# Patient Record
Sex: FEMALE | Race: WHITE | NOT HISPANIC OR LATINO | Employment: FULL TIME | URBAN - METROPOLITAN AREA
[De-identification: names, ages, dates, MRNs, and addresses within clinical notes are randomized per-mention and may not be internally consistent; named-entity substitution may affect disease eponyms.]

---

## 2019-08-23 ENCOUNTER — HOSPITAL ENCOUNTER (OUTPATIENT)
Dept: MAMMOGRAPHY | Facility: HOSPITAL | Age: 59
Discharge: HOME | End: 2019-08-23
Attending: GENERAL PRACTICE

## 2019-08-30 ENCOUNTER — HOSPITAL ENCOUNTER (OUTPATIENT)
Dept: MAMMOGRAPHY | Facility: HOSPITAL | Age: 59
Discharge: HOME | End: 2019-08-30
Attending: GENERAL PRACTICE

## 2019-09-09 ENCOUNTER — TELEPHONE (OUTPATIENT)
Dept: SURGERY | Facility: CLINIC | Age: 59
End: 2019-09-09

## 2019-09-09 NOTE — TELEPHONE ENCOUNTER
New patient appt to see Dr. Quach 10/18/19 1030 am. I had to leave patient a message to call us back and confirm.     np paperwork mailed. lml

## 2019-09-10 ENCOUNTER — TELEPHONE (OUTPATIENT)
Dept: SURGERY | Facility: CLINIC | Age: 59
End: 2019-09-10

## 2019-09-10 DIAGNOSIS — N60.99 ATYPICAL DUCTAL HYPERPLASIA OF BREAST: Primary | ICD-10-CM

## 2019-09-10 NOTE — TELEPHONE ENCOUNTER
Scheduled Bilateral Breast Mri w wo contrast  [facility]  9-24-19 arrive 2:15        LM for pt to call:  Need to pre screen for breast mri    [email or web address]

## 2019-09-24 ENCOUNTER — HOSPITAL ENCOUNTER (OUTPATIENT)
Dept: MRI IMAGING | Facility: HOSPITAL | Age: 59
Discharge: HOME | End: 2019-09-24
Admitting: SURGERY

## 2019-09-24 DIAGNOSIS — N60.99 ATYPICAL DUCTAL HYPERPLASIA OF BREAST: ICD-10-CM

## 2019-09-24 PROCEDURE — A9577 INJ MULTIHANCE: HCPCS | Performed by: SURGERY

## 2019-09-24 PROCEDURE — 77049 MRI BREAST C-+ W/CAD BI: CPT

## 2019-09-24 PROCEDURE — 0 GADOBENATE DIMEGLUMINE 529 MG/ML SOLUTION: Performed by: SURGERY

## 2019-09-24 PROCEDURE — 82565 ASSAY OF CREATININE: CPT

## 2019-09-24 RX ADMIN — GADOBENATE DIMEGLUMINE 20 ML: 529 INJECTION, SOLUTION INTRAVENOUS at 15:38

## 2019-09-25 LAB — CREAT BLDA-MCNC: 0.7 MG/DL (ref 0.6–1.3)

## 2019-10-18 ENCOUNTER — OFFICE VISIT (OUTPATIENT)
Dept: SURGERY | Facility: CLINIC | Age: 59
End: 2019-10-18

## 2019-10-18 ENCOUNTER — PREP FOR SURGERY (OUTPATIENT)
Dept: OTHER | Facility: HOSPITAL | Age: 59
End: 2019-10-18

## 2019-10-18 VITALS
DIASTOLIC BLOOD PRESSURE: 72 MMHG | OXYGEN SATURATION: 98 % | BODY MASS INDEX: 35.16 KG/M2 | HEART RATE: 69 BPM | HEIGHT: 68 IN | SYSTOLIC BLOOD PRESSURE: 128 MMHG | WEIGHT: 232 LBS

## 2019-10-18 DIAGNOSIS — E66.9 OBESITY (BMI 35.0-39.9 WITHOUT COMORBIDITY): ICD-10-CM

## 2019-10-18 DIAGNOSIS — R92.1 BREAST CALCIFICATIONS ON MAMMOGRAM: ICD-10-CM

## 2019-10-18 DIAGNOSIS — N60.99 ATYPICAL DUCTAL HYPERPLASIA OF BREAST: Primary | ICD-10-CM

## 2019-10-18 PROCEDURE — 99244 OFF/OP CNSLTJ NEW/EST MOD 40: CPT | Performed by: SURGERY

## 2019-10-18 RX ORDER — CEFAZOLIN SODIUM 2 G/100ML
2 INJECTION, SOLUTION INTRAVENOUS ONCE
Status: CANCELLED | OUTPATIENT
Start: 2019-11-21 | End: 2019-10-18

## 2019-10-18 RX ORDER — DIAZEPAM 5 MG/1
10 TABLET ORAL ONCE
Status: CANCELLED | OUTPATIENT
Start: 2019-11-21 | End: 2019-10-18

## 2019-10-18 RX ORDER — SODIUM CHLORIDE, SODIUM LACTATE, POTASSIUM CHLORIDE, CALCIUM CHLORIDE 600; 310; 30; 20 MG/100ML; MG/100ML; MG/100ML; MG/100ML
100 INJECTION, SOLUTION INTRAVENOUS CONTINUOUS
Status: CANCELLED | OUTPATIENT
Start: 2019-11-21

## 2019-10-18 NOTE — PROGRESS NOTES
Chief Complaint: Mabel Granger is a 59 y.o. female who was seen in consultation at the request of Nayana Cadena MD  for abnormal breast imaging    History of Present Illness:  Patient presents with Atypical duct hyperplasia left breast.. She noted no new masses, skin changes, nipple discharge, nipple changes prior to her most recent imaging.    Her most recent imaging includes the followin2019  [facility]    Mammo  Mabel [place]  MAMMO SCREEN MOBILE BILAT  Compared to prior imaging studies dated 2018.  Scattered areas of fibroglandular densities.  Calcifications with grouped distribution seen in the left breast at 6:00.  BIRADS Category: 0  MOBILE BILAT SCREENING  Scattered areas of fibroglandular densities.  There is no mammographic evidence of malignancy.  BIRADS Category: 1    2019  [facility]  [place]  DIAGNOSTIC LEFT AMALIA AND 3D JOSE LUIS  Subtle group of amorphous calcifications in the lower, slightly inner left breast, middle depth.  DIAGNOSTIC CATEGORY: 4    2019  [facility]   Radiology  [facility]  MRI BREAST BILATERAL  Metallic clip artifact lower-inner quadrant of left breast. No areas of abnormal enhancement area seen in the vicinity of the metallic clip or in the left breast.  There are no findings suspicious for malignancy in either breast.  BIRADS Category: 2, Benign findings    She had a biopsy on the following day that showed:   2019  [facility]  STEREO BREAST BX/ LT CALC  9 gauge.  Twelve specimens. A stoplight shaped clip was then placed. The clip was observed to fall out. 1 subsequently used free hand technique to place a ribbon clip, since the Z coordinate was 1.5 cm. The biopsy clip was in expected position in the biopsy bed.    2019  [facility]  DIGITAL DIAGNOSTIC POST BIOPSY  Ribbon clip was in satisfactory position in the biopsy bed.    2019  [place]  [facility]  Pathology  Mabel Granger  Left breast, lower inner quadrant stereotactic-guided core biopsy:  - Focal atypical ductal hyperplasia (ADH)  - Mild usual ductal hyperplasia  - Duct octasia  - Microcalcifications  - Negative for Malignancy        She has had her uterus and ovaries removed, is postmenopausal, and takes nor hormones.  She took estrogen alone patch for approximately 3 years, approximately 8 years ago.  She had a hysterectomy and oophorectomy in 2006 for cysts.  Her family history includes the following: She has 1 daughter, 1 sister, no maternal aunts, one paternal aunt.  No family history of breast or ovarian cancer.  She is here for evaluation.    Review of Systems:  Review of Systems   Endocrine: Positive for hot flashes.   Musculoskeletal: Positive for arthralgias and back pain.   All other systems reviewed and are negative.       Past Medical and Surgical History:  Breast Biopsy History:  Patient has had the following breast biopsies:8/30/2019 LEFT STEREOTACTIC  Breast Cancer HIstory:  Patient does not have a past medical history of breast cancer.  Breast Operations, and year:  NONE  Obstetric/Gynecologic History:  Age menstrual periods began: 11  Patient is postmenopausal due to removal of her uterus and both ovaries in the following year: 2006   Number of pregnancies:1  Number of live births: 1  Number of abortions or miscarriages: 0  Age of delivery of first child: 32  Patient breast fed, for the following lenth of time: 3 MONTHS   Length of time taking birth control pills: 3-4 YRS   Patient took hormone replacement during the following dates: 3 YRS   PATIENT HAD UTERUS AND OVARIES REMOVED 2006.    Past Surgical History:   Procedure Laterality Date   • BUNIONECTOMY     • HYSTERECTOMY     • MAMMO STEREOTACTIC BREAST BIOPSY 1ST W WO DEVICE     • TUBAL ABDOMINAL LIGATION         History reviewed. No pertinent past medical history.    Prior Hospitalizations, other than for surgery or  "childbirth, and year:  NONE     Social History     Socioeconomic History   • Marital status:      Spouse name: Not on file   • Number of children: Not on file   • Years of education: Not on file   • Highest education level: Not on file   Tobacco Use   • Smoking status: Former Smoker     Types: Cigarettes     Start date:      Last attempt to quit: 2007     Years since quittin.8   • Smokeless tobacco: Never Used   Substance and Sexual Activity   • Alcohol use: Yes     Comment: 3-4 x per year   • Drug use: No     Patient is .  Patient is employed full time with the following occupation: [employer]  Patient drinks 2 servings of caffeine per day.    Family History:  Family History   Problem Relation Age of Onset   • Colon cancer Mother 55   • Pancreatic cancer Maternal Grandmother 80   • Throat cancer Maternal Grandfather 75       Vital Signs:  /72 (BP Location: Left arm, Patient Position: Sitting, Cuff Size: Adult)   Pulse 69   Ht 172.7 cm (68\")   Wt 105 kg (232 lb)   LMP  (LMP Unknown)   SpO2 98%   Breastfeeding? No   BMI 35.28 kg/m²      Medications:  No current outpatient medications on file.     Allergies:  No Known Allergies    Physical Examination:  /72 (BP Location: Left arm, Patient Position: Sitting, Cuff Size: Adult)   Pulse 69   Ht 172.7 cm (68\")   Wt 105 kg (232 lb)   LMP  (LMP Unknown)   SpO2 98%   Breastfeeding? No   BMI 35.28 kg/m²   General Appearance:  Patient is in no distress.  She is well kept and has an obese build.   Psychiatric:  Patient with appropriate mood and affect. Alert and oriented to self, time, and place.    Breast, RIGHT:  large sized, 40 4D, symmetric with the contralateral side.  Breast skin is without erythema, edema, rashes.  There are no visible abnormalities upon inspection during the arm-raising maneuver or with hands on hips in the sitting position. There is no nipple retraction, discharge or nipple/areolar skin changes.There are " no masses palpable in the sitting or supine positions.    Breast, LEFT:  large sized, 80 4D, symmetric with the contralateral side.  Breast skin is without erythema, edema, rashes.  There are no visible abnormalities upon inspection during the arm-raising maneuver or with hands on hips in the sitting position. There is no nipple retraction, discharge or nipple/areolar skin changes.There are no masses palpable in the sitting or supine positions.  Well-healed mammotomy from her stereotactic biopsy with no erythema warmth or drainage at the 6:00 inner ring location.    Lymphatic:  There is no axillary, cervical, infraclavicular, or supraclavicular adenopathy bilaterally.  Eyes:  Pupils are round and reactive to light.  Cardiovascular:  Heart rate and rhythm are regular.  Respiratory:  Lungs are clear bilaterally with no crackles or wheezes in any lung field.  Gastrointestinal:  Abdomen is soft, nondistended, and nontender. *    Musculoskeletal:  Good strength in all 4 extremities.   There is good range of motion in both shoulders.    Skin:  No new skin lesions or rashes on the skin excluding the breast (see breast exam above).        Imagin2019  [facility]    Mammo  [facility]  MAMMO SCREEN MOBILE BILAT  Compared to prior imaging studies dated 2018.  Scattered areas of fibroglandular densities.  Calcifications with grouped distribution seen in the left breast at 6:00.  BIRADS Category: 0  MOBILE BILAT SCREENING  Scattered areas of fibroglandular densities.  There is no mammographic evidence of malignancy.  BIRADS Category: 1    2019  [facility]  Mammo  [facility]  DIAGNOSTIC LEFT AMALIA AND 3D JOSE LUIS  Subtle group of amorphous calcifications in the lower, slightly inner left breast, middle depth.  DIAGNOSTIC CATEGORY: 4    2019  [facility]   Radiology  [facility]  MRI BREAST BILATERAL  Metallic clip artifact lower-inner quadrant of left breast. No areas of abnormal enhancement  area seen in the vicinity of the metallic clip or in the left breast.  There are no findings suspicious for malignancy in either breast.  BIRADS Category: 2, Benign findings    Pathology:  08/30/2019  [facility]  Radiology  [place]  STEREO BREAST BX/ LT CALC  9 gauge.  Twelve specimens. A stoplight shaped clip was then placed. The clip was observed to fall out. 1 subsequently used free hand technique to place a ribbon clip, since the Z coordinate was 1.5 cm. The biopsy clip was in expected position in the biopsy bed.    08/30/2019  [facility]  Radiology  [facility]  DIGITAL DIAGNOSTIC POST BIOPSY  Ribbon clip was in satisfactory position in the biopsy bed.    08/30/2019  [facility]  Left breast, lower inner quadrant stereotactic-guided core biopsy:  - Focal atypical ductal hyperplasia (ADH)  - Mild usual ductal hyperplasia  - Duct octasia  - Microcalcifications  - Negative for Malignancy    Procedures:      Assessment:   Diagnosis Plan   1. Atypical ductal hyperplasia of breast  Ambulatory Referral to Nutrition Services   2. Breast calcifications on mammogram     3. Obesity (BMI 35.0-39.9 without comorbidity)  Ambulatory Referral to Nutrition Services     1-2  Right breast lower inner quadrant calcifications on mammogram.  No residual calcifications and no residual enhancement on MRI.  Ribbon marker in good position.  Focal atypical duct hyperplasia, usual hyperplasia, duct ectasia and calcifications.  No family history    3-    BMI 35      Plan:  Mabel and KATLIN reviewed her history, imaging, imaging reports and examination together today.  We discussed that atypical hyperplasia, LCIS and family history are the 3 strongest risk factors for the development  of breast cancer outside of a known genetic predisposition. We discussed that for atypical hyperplasia that we recommend excision to ensure no pathologic upgrade and also to remove any remaining abnormal  cells. We discussed that for a Whitney risk > 1.7, that we recommend consultation with medical oncology about risks and benefits of hormonal blockade. We discussed that for a risk (using a model that incorporates detailed family history) > 20% that we recommend MRI for surveillance.   Her Whitney 5 year risk is-1.4 to 7-1/2%.  Her lifetime risk of breast cancer using the [facility] pro- Vamsiceasar Carloteddy model is-8 to 36%.  Based on the above, I have recommended the following:  #1 left breast needle localized lumpectomy for atypical duct hyperplasia.    #2 annual MRI along with mammography for surveillance.  Her next mammogram would be due July 25, 2020 and her next MRI would be due September 26, 2020 so we will plan to do those both together.  3.  Risk reduction: We discussed meeting with a medical oncologist to talk about medicines that reduce breast cancer risk.  We also discussed weight loss.  At this point she has declined a consultation with medical oncology.  She is already in the process of trying to lose weight.  She has gained 40 pounds over the past several years and recently has lost 20 pounds over the past 1 year.  I offered her a consultation with our nutritionist which she would like to pursue so we will schedule this for her.  We discussed the risks of bleeding, infection with the procedure.  She will schedule at her convenience with Kayleen Quach MD        We have spent 60 minutes in visit today, 45 in face to face .      Next Appointment:  Return for surgery.      EMR Dragon/transcription disclaimer:    Much of this encounter note is an electronic transcription/translocation of spoken language to printed text.  The electronic translation of spoken language may permit erroneous, or at times, nonsensical words or phrases to be inadvertently transcribed.  Although I have reviewed the note from such areas, some may still exist.

## 2019-10-23 ENCOUNTER — TRANSCRIBE ORDERS (OUTPATIENT)
Dept: SURGERY | Facility: CLINIC | Age: 59
End: 2019-10-23

## 2019-10-23 DIAGNOSIS — N60.99 ATYPICAL DUCTAL HYPERPLASIA OF BREAST: Primary | ICD-10-CM

## 2019-10-25 ENCOUNTER — TELEPHONE (OUTPATIENT)
Dept: SURGERY | Facility: CLINIC | Age: 59
End: 2019-10-25

## 2019-10-25 PROBLEM — N60.99 ATYPICAL DUCTAL HYPERPLASIA OF BREAST: Status: ACTIVE | Noted: 2019-10-25

## 2019-10-25 NOTE — TELEPHONE ENCOUNTER
Scheduled Surgery 11-21-19  Left Breast Needle Localized Lumpectomy  For atypical hyperplasia    Arrive      5;45  NL          7:30  Surgery  Tf      Pat 11-11-19 @ 1:30  Post Op 12-3-19 arrive 1:45    Spoke to pt she v/u with appts  hangl

## 2019-10-31 ENCOUNTER — TELEPHONE (OUTPATIENT)
Dept: SURGERY | Facility: CLINIC | Age: 59
End: 2019-10-31

## 2019-10-31 NOTE — TELEPHONE ENCOUNTER
LM to arrive for surgery on 11-21-19 at 6:30am        Surgery-Left Breast Needle Localized Lumpectomy for Atypical Hyperplasia    Arrive    6:30  NL         8:30  TF    PAT 11-11-19 1:30  Post Op 12-3-19 arrive 1L45    kdl

## 2019-11-06 ENCOUNTER — TELEPHONE (OUTPATIENT)
Dept: SURGERY | Facility: CLINIC | Age: 59
End: 2019-11-06

## 2019-11-11 ENCOUNTER — APPOINTMENT (OUTPATIENT)
Dept: PREADMISSION TESTING | Facility: HOSPITAL | Age: 59
End: 2019-11-11

## 2019-11-11 VITALS
DIASTOLIC BLOOD PRESSURE: 85 MMHG | WEIGHT: 229.5 LBS | OXYGEN SATURATION: 98 % | TEMPERATURE: 98.1 F | RESPIRATION RATE: 20 BRPM | HEART RATE: 78 BPM | BODY MASS INDEX: 34.78 KG/M2 | HEIGHT: 68 IN | SYSTOLIC BLOOD PRESSURE: 130 MMHG

## 2019-11-11 LAB
DEPRECATED RDW RBC AUTO: 44 FL (ref 37–54)
ERYTHROCYTE [DISTWIDTH] IN BLOOD BY AUTOMATED COUNT: 13 % (ref 12.3–15.4)
HCT VFR BLD AUTO: 38.2 % (ref 34–46.6)
HGB BLD-MCNC: 12.8 G/DL (ref 12–15.9)
MCH RBC QN AUTO: 30.9 PG (ref 26.6–33)
MCHC RBC AUTO-ENTMCNC: 33.5 G/DL (ref 31.5–35.7)
MCV RBC AUTO: 92.3 FL (ref 79–97)
PLATELET # BLD AUTO: 343 10*3/MM3 (ref 140–450)
PMV BLD AUTO: 9.9 FL (ref 6–12)
RBC # BLD AUTO: 4.14 10*6/MM3 (ref 3.77–5.28)
WBC NRBC COR # BLD: 8.1 10*3/MM3 (ref 3.4–10.8)

## 2019-11-11 PROCEDURE — 36415 COLL VENOUS BLD VENIPUNCTURE: CPT

## 2019-11-11 PROCEDURE — 85027 COMPLETE CBC AUTOMATED: CPT | Performed by: SURGERY

## 2019-11-11 NOTE — DISCHARGE INSTRUCTIONS
Take the following medications the morning of surgery with a small sip of water:    NONE    ARRIVE TO OUTPT SURGERY AT 6:30 AM ON 11/21/19    General Instructions:  • Do not eat solid food after midnight the night before surgery.  • You may drink clear liquids day of surgery but must stop at least one hour before your hospital arrival time.  • It is beneficial for you to have a clear drink that contains carbohydrates the day of surgery.  We suggest a 12 to 20 ounce bottle of Gatorade or Powerade for non-diabetic patients or a 12 to 20 ounce bottle of G2 or Powerade Zero for diabetic patients. (Pediatric patients, are not advised to drink a 12 to 20 ounce carbohydrate drink)    Clear liquids are liquids you can see through.  Nothing red in color.     Plain water                               Sports drinks  Sodas                                   Gelatin (Jell-O)  Fruit juices without pulp such as white grape juice and apple juice  Popsicles that contain no fruit or yogurt  Tea or coffee (no cream or milk added)  Gatorade / Powerade  G2 / Powerade Zero    • Infants may have breast milk up to four hours before surgery.  • Infants drinking formula may drink formula up to six hours before surgery.   • Patients who avoid smoking, chewing tobacco and alcohol for 4 weeks prior to surgery have a reduced risk of post-operative complications.  Quit smoking as many days before surgery as you can.  • Do not smoke, use chewing tobacco or drink alcohol the day of surgery.   • If applicable bring your C-PAP/ BI-PAP machine.  • Bring any papers given to you in the doctor’s office.  • Wear clean comfortable clothes.  • Do not wear contact lenses, false eyelashes or make-up.  Bring a case for your glasses.   • Bring crutches or walker if applicable.  • Remove all piercings.  Leave jewelry and any other valuables at home.  • Hair extensions with metal clips must be removed prior to surgery.  • The Pre-Admission Testing nurse will  instruct you to bring medications if unable to obtain an accurate list in Pre-Admission Testing.            Preventing a Surgical Site Infection:  • For 2 to 3 days before surgery, avoid shaving with a razor because the razor can irritate skin and make it easier to develop an infection.    • Any areas of open skin can increase the risk of a post-operative wound infection by allowing bacteria to enter and travel throughout the body.  Notify your surgeon if you have any skin wounds / rashes even if it is not near the expected surgical site.  The area will need assessed to determine if surgery should be delayed until it is healed.  • The night prior to surgery sleep in a clean bed with clean clothing.  Do not allow pets to sleep with you.  • Shower on the morning of surgery using a fresh bar of anti-bacterial soap (such as Dial) and clean washcloth.  Dry with a clean towel and dress in clean clothing.  • Ask your surgeon if you will be receiving antibiotics prior to surgery.  • Make sure you, your family, and all healthcare providers clean their hands with soap and water or an alcohol based hand  before caring for you or your wound.    Day of surgery:  Your arrival time is approximately two hours before your scheduled surgery time.  Upon arrival, a Pre-op nurse and Anesthesiologist will review your health history, obtain vital signs, and answer questions you may have.  The only belongings needed at this time will be a list of your home medications and if applicable your C-PAP/BI-PAP machine.  If you are staying overnight your family can leave the rest of your belongings in the car and bring them to your room later.  A Pre-op nurse will start an IV and you may receive medication in preparation for surgery, including something to help you relax.  Your family will be able to see you in the Pre-op area.  Two visitors at a time will be allowed in the Pre-op room.  While you are in surgery your family should notify  the waiting room  if they leave the waiting room area and provide a contact phone number.    Please be aware that surgery does come with discomfort.  We want to make every effort to control your discomfort so please discuss any uncontrolled symptoms with your nurse.   Your doctor will most likely have prescribed pain medications.      If you are going home after surgery you will receive individualized written care instructions before being discharged.  A responsible adult must drive you to and from the hospital on the day of your surgery and stay with you for 24 hours.    If you are staying overnight following surgery, you will be transported to your hospital room following the recovery period.  [facility] has all private rooms.    You have received a list of surgical assistants for your reference.  If you have any questions please call Pre-Admission Testing at [phone].  Deductibles and co-payments are collected on the day of service. Please be prepared to pay the required co-pay, deductible or deposit on the day of service as defined by your plan.

## 2019-11-14 ENCOUNTER — TELEPHONE (OUTPATIENT)
Dept: SURGERY | Facility: CLINIC | Age: 59
End: 2019-11-14

## 2019-11-14 NOTE — TELEPHONE ENCOUNTER
----- Message from Delphine Sow sent at 2019  3:47 PM EST -----  Regardin/21  Contact: [phone]  Pt called in regards to a procedure she is to have on . Stated she received a letter from her insurance denying coverage of the procedure. Pt wanted to know what the next step was going to be.

## 2019-11-21 ENCOUNTER — HOSPITAL ENCOUNTER (OUTPATIENT)
Dept: MAMMOGRAPHY | Facility: HOSPITAL | Age: 59
Discharge: HOME | End: 2019-11-21

## 2019-11-21 ENCOUNTER — APPOINTMENT (OUTPATIENT)
Dept: GENERAL RADIOLOGY | Facility: HOSPITAL | Age: 59
End: 2019-11-21

## 2019-11-21 ENCOUNTER — HOSPITAL ENCOUNTER (OUTPATIENT)
Facility: HOSPITAL | Age: 59
Setting detail: HOSPITAL OUTPATIENT SURGERY
Discharge: HOME | End: 2019-11-21
Attending: SURGERY | Admitting: SURGERY

## 2019-11-21 ENCOUNTER — ANESTHESIA (OUTPATIENT)
Dept: PERIOP | Facility: HOSPITAL | Age: 59
End: 2019-11-21

## 2019-11-21 ENCOUNTER — ANESTHESIA EVENT (OUTPATIENT)
Dept: PERIOP | Facility: HOSPITAL | Age: 59
End: 2019-11-21

## 2019-11-21 VITALS
DIASTOLIC BLOOD PRESSURE: 69 MMHG | TEMPERATURE: 97 F | OXYGEN SATURATION: 99 % | RESPIRATION RATE: 16 BRPM | HEART RATE: 53 BPM | SYSTOLIC BLOOD PRESSURE: 121 MMHG | WEIGHT: 230.82 LBS | BODY MASS INDEX: 35.1 KG/M2

## 2019-11-21 DIAGNOSIS — N60.99 ATYPICAL DUCTAL HYPERPLASIA OF BREAST: ICD-10-CM

## 2019-11-21 PROCEDURE — 25010000003 LIDOCAINE 1 % SOLUTION 20 ML VIAL: Performed by: SURGERY

## 2019-11-21 PROCEDURE — 25010000002 ONDANSETRON PER 1 MG: Performed by: NURSE ANESTHETIST, CERTIFIED REGISTERED

## 2019-11-21 PROCEDURE — 25010000003 LIDOCAINE 1 % SOLUTION: Performed by: SURGERY

## 2019-11-21 PROCEDURE — 25010000003 CEFAZOLIN IN DEXTROSE 2-4 GM/100ML-% SOLUTION: Performed by: SURGERY

## 2019-11-21 PROCEDURE — 19301 PARTIAL MASTECTOMY: CPT | Performed by: SURGERY

## 2019-11-21 PROCEDURE — 76098 X-RAY EXAM SURGICAL SPECIMEN: CPT

## 2019-11-21 PROCEDURE — 88307 TISSUE EXAM BY PATHOLOGIST: CPT | Performed by: SURGERY

## 2019-11-21 PROCEDURE — 25010000002 FENTANYL CITRATE (PF) 100 MCG/2ML SOLUTION: Performed by: ANESTHESIOLOGY

## 2019-11-21 PROCEDURE — 25010000002 DEXAMETHASONE PER 1 MG: Performed by: NURSE ANESTHETIST, CERTIFIED REGISTERED

## 2019-11-21 PROCEDURE — 25010000002 PROPOFOL 10 MG/ML EMULSION: Performed by: NURSE ANESTHETIST, CERTIFIED REGISTERED

## 2019-11-21 PROCEDURE — S0260 H&P FOR SURGERY: HCPCS | Performed by: SURGERY

## 2019-11-21 PROCEDURE — 25010000002 FENTANYL CITRATE (PF) 100 MCG/2ML SOLUTION: Performed by: NURSE ANESTHETIST, CERTIFIED REGISTERED

## 2019-11-21 RX ORDER — PROMETHAZINE HYDROCHLORIDE 25 MG/1
25 SUPPOSITORY RECTAL ONCE AS NEEDED
Status: DISCONTINUED | OUTPATIENT
Start: 2019-11-21 | End: 2019-11-21 | Stop reason: HOSPADM

## 2019-11-21 RX ORDER — SODIUM CHLORIDE, SODIUM LACTATE, POTASSIUM CHLORIDE, CALCIUM CHLORIDE 600; 310; 30; 20 MG/100ML; MG/100ML; MG/100ML; MG/100ML
9 INJECTION, SOLUTION INTRAVENOUS CONTINUOUS
Status: DISCONTINUED | OUTPATIENT
Start: 2019-11-21 | End: 2019-11-21 | Stop reason: HOSPADM

## 2019-11-21 RX ORDER — LABETALOL HYDROCHLORIDE 5 MG/ML
5 INJECTION, SOLUTION INTRAVENOUS
Status: DISCONTINUED | OUTPATIENT
Start: 2019-11-21 | End: 2019-11-21 | Stop reason: HOSPADM

## 2019-11-21 RX ORDER — ONDANSETRON 2 MG/ML
4 INJECTION INTRAMUSCULAR; INTRAVENOUS ONCE AS NEEDED
Status: DISCONTINUED | OUTPATIENT
Start: 2019-11-21 | End: 2019-11-21 | Stop reason: SDUPTHER

## 2019-11-21 RX ORDER — PROMETHAZINE HYDROCHLORIDE 25 MG/ML
6.25 INJECTION, SOLUTION INTRAMUSCULAR; INTRAVENOUS ONCE AS NEEDED
Status: DISCONTINUED | OUTPATIENT
Start: 2019-11-21 | End: 2019-11-21 | Stop reason: HOSPADM

## 2019-11-21 RX ORDER — ACETAMINOPHEN 325 MG/1
650 TABLET ORAL ONCE AS NEEDED
Status: DISCONTINUED | OUTPATIENT
Start: 2019-11-21 | End: 2019-11-21 | Stop reason: HOSPADM

## 2019-11-21 RX ORDER — FENTANYL CITRATE 50 UG/ML
50 INJECTION, SOLUTION INTRAMUSCULAR; INTRAVENOUS
Status: DISCONTINUED | OUTPATIENT
Start: 2019-11-21 | End: 2019-11-21 | Stop reason: HOSPADM

## 2019-11-21 RX ORDER — PROMETHAZINE HYDROCHLORIDE 25 MG/1
25 TABLET ORAL ONCE AS NEEDED
Status: DISCONTINUED | OUTPATIENT
Start: 2019-11-21 | End: 2019-11-21 | Stop reason: HOSPADM

## 2019-11-21 RX ORDER — FENTANYL CITRATE 50 UG/ML
50 INJECTION, SOLUTION INTRAMUSCULAR; INTRAVENOUS
Status: DISCONTINUED | OUTPATIENT
Start: 2019-11-21 | End: 2019-11-21 | Stop reason: SDUPTHER

## 2019-11-21 RX ORDER — FENTANYL CITRATE 50 UG/ML
100 INJECTION, SOLUTION INTRAMUSCULAR; INTRAVENOUS
Status: DISCONTINUED | OUTPATIENT
Start: 2019-11-21 | End: 2019-11-21 | Stop reason: HOSPADM

## 2019-11-21 RX ORDER — DIPHENHYDRAMINE HCL 25 MG
25 CAPSULE ORAL
Status: DISCONTINUED | OUTPATIENT
Start: 2019-11-21 | End: 2019-11-21 | Stop reason: HOSPADM

## 2019-11-21 RX ORDER — HYDRALAZINE HYDROCHLORIDE 20 MG/ML
5 INJECTION INTRAMUSCULAR; INTRAVENOUS
Status: DISCONTINUED | OUTPATIENT
Start: 2019-11-21 | End: 2019-11-21 | Stop reason: SDUPTHER

## 2019-11-21 RX ORDER — HYDROMORPHONE HYDROCHLORIDE 1 MG/ML
0.5 INJECTION, SOLUTION INTRAMUSCULAR; INTRAVENOUS; SUBCUTANEOUS
Status: DISCONTINUED | OUTPATIENT
Start: 2019-11-21 | End: 2019-11-21 | Stop reason: SDUPTHER

## 2019-11-21 RX ORDER — SODIUM CHLORIDE 0.9 % (FLUSH) 0.9 %
3 SYRINGE (ML) INJECTION EVERY 12 HOURS SCHEDULED
Status: DISCONTINUED | OUTPATIENT
Start: 2019-11-21 | End: 2019-11-21 | Stop reason: HOSPADM

## 2019-11-21 RX ORDER — HYDROMORPHONE HYDROCHLORIDE 1 MG/ML
0.5 INJECTION, SOLUTION INTRAMUSCULAR; INTRAVENOUS; SUBCUTANEOUS
Status: DISCONTINUED | OUTPATIENT
Start: 2019-11-21 | End: 2019-11-21 | Stop reason: HOSPADM

## 2019-11-21 RX ORDER — HYDROCODONE BITARTRATE AND ACETAMINOPHEN 7.5; 325 MG/1; MG/1
1 TABLET ORAL ONCE AS NEEDED
Status: DISCONTINUED | OUTPATIENT
Start: 2019-11-21 | End: 2019-11-21 | Stop reason: SDUPTHER

## 2019-11-21 RX ORDER — PROPOFOL 10 MG/ML
VIAL (ML) INTRAVENOUS AS NEEDED
Status: DISCONTINUED | OUTPATIENT
Start: 2019-11-21 | End: 2019-11-21 | Stop reason: SURG

## 2019-11-21 RX ORDER — EPHEDRINE SULFATE 50 MG/ML
5 INJECTION, SOLUTION INTRAVENOUS ONCE AS NEEDED
Status: DISCONTINUED | OUTPATIENT
Start: 2019-11-21 | End: 2019-11-21 | Stop reason: SDUPTHER

## 2019-11-21 RX ORDER — OXYCODONE AND ACETAMINOPHEN 7.5; 325 MG/1; MG/1
1 TABLET ORAL EVERY 4 HOURS PRN
Status: DISCONTINUED | OUTPATIENT
Start: 2019-11-21 | End: 2019-11-21 | Stop reason: HOSPADM

## 2019-11-21 RX ORDER — FENTANYL CITRATE 50 UG/ML
INJECTION, SOLUTION INTRAMUSCULAR; INTRAVENOUS AS NEEDED
Status: DISCONTINUED | OUTPATIENT
Start: 2019-11-21 | End: 2019-11-21 | Stop reason: SURG

## 2019-11-21 RX ORDER — PROMETHAZINE HYDROCHLORIDE 25 MG/ML
6.25 INJECTION, SOLUTION INTRAMUSCULAR; INTRAVENOUS
Status: DISCONTINUED | OUTPATIENT
Start: 2019-11-21 | End: 2019-11-21 | Stop reason: SDUPTHER

## 2019-11-21 RX ORDER — LIDOCAINE HYDROCHLORIDE 10 MG/ML
3 INJECTION, SOLUTION INFILTRATION; PERINEURAL ONCE
Status: COMPLETED | OUTPATIENT
Start: 2019-11-21 | End: 2019-11-21

## 2019-11-21 RX ORDER — SODIUM CHLORIDE, SODIUM LACTATE, POTASSIUM CHLORIDE, CALCIUM CHLORIDE 600; 310; 30; 20 MG/100ML; MG/100ML; MG/100ML; MG/100ML
100 INJECTION, SOLUTION INTRAVENOUS CONTINUOUS
Status: DISCONTINUED | OUTPATIENT
Start: 2019-11-21 | End: 2019-11-21 | Stop reason: HOSPADM

## 2019-11-21 RX ORDER — EPHEDRINE SULFATE 50 MG/ML
5 INJECTION, SOLUTION INTRAVENOUS ONCE AS NEEDED
Status: DISCONTINUED | OUTPATIENT
Start: 2019-11-21 | End: 2019-11-21 | Stop reason: HOSPADM

## 2019-11-21 RX ORDER — DIAZEPAM 5 MG/1
10 TABLET ORAL ONCE
Status: COMPLETED | OUTPATIENT
Start: 2019-11-21 | End: 2019-11-21

## 2019-11-21 RX ORDER — ACETAMINOPHEN 650 MG/1
650 SUPPOSITORY RECTAL ONCE AS NEEDED
Status: DISCONTINUED | OUTPATIENT
Start: 2019-11-21 | End: 2019-11-21 | Stop reason: HOSPADM

## 2019-11-21 RX ORDER — MIDAZOLAM HYDROCHLORIDE 1 MG/ML
1 INJECTION INTRAMUSCULAR; INTRAVENOUS
Status: DISCONTINUED | OUTPATIENT
Start: 2019-11-21 | End: 2019-11-21 | Stop reason: HOSPADM

## 2019-11-21 RX ORDER — FAMOTIDINE 10 MG/ML
20 INJECTION, SOLUTION INTRAVENOUS ONCE
Status: COMPLETED | OUTPATIENT
Start: 2019-11-21 | End: 2019-11-21

## 2019-11-21 RX ORDER — HYDRALAZINE HYDROCHLORIDE 20 MG/ML
5 INJECTION INTRAMUSCULAR; INTRAVENOUS
Status: DISCONTINUED | OUTPATIENT
Start: 2019-11-21 | End: 2019-11-21 | Stop reason: HOSPADM

## 2019-11-21 RX ORDER — ACETAMINOPHEN 500 MG
1000 TABLET ORAL EVERY 6 HOURS PRN
COMMUNITY

## 2019-11-21 RX ORDER — OXYCODONE AND ACETAMINOPHEN 7.5; 325 MG/1; MG/1
1 TABLET ORAL ONCE AS NEEDED
Status: DISCONTINUED | OUTPATIENT
Start: 2019-11-21 | End: 2019-11-21 | Stop reason: SDUPTHER

## 2019-11-21 RX ORDER — NALOXONE HCL 0.4 MG/ML
0.2 VIAL (ML) INJECTION AS NEEDED
Status: DISCONTINUED | OUTPATIENT
Start: 2019-11-21 | End: 2019-11-21 | Stop reason: HOSPADM

## 2019-11-21 RX ORDER — LIDOCAINE HYDROCHLORIDE 10 MG/ML
0.5 INJECTION, SOLUTION EPIDURAL; INFILTRATION; INTRACAUDAL; PERINEURAL ONCE AS NEEDED
Status: DISCONTINUED | OUTPATIENT
Start: 2019-11-21 | End: 2019-11-21 | Stop reason: HOSPADM

## 2019-11-21 RX ORDER — PROMETHAZINE HYDROCHLORIDE 25 MG/ML
12.5 INJECTION, SOLUTION INTRAMUSCULAR; INTRAVENOUS ONCE AS NEEDED
Status: DISCONTINUED | OUTPATIENT
Start: 2019-11-21 | End: 2019-11-21 | Stop reason: SDUPTHER

## 2019-11-21 RX ORDER — MIDAZOLAM HYDROCHLORIDE 1 MG/ML
2 INJECTION INTRAMUSCULAR; INTRAVENOUS
Status: DISCONTINUED | OUTPATIENT
Start: 2019-11-21 | End: 2019-11-21 | Stop reason: HOSPADM

## 2019-11-21 RX ORDER — PROMETHAZINE HYDROCHLORIDE 25 MG/1
25 TABLET ORAL ONCE AS NEEDED
Status: DISCONTINUED | OUTPATIENT
Start: 2019-11-21 | End: 2019-11-21 | Stop reason: SDUPTHER

## 2019-11-21 RX ORDER — IBUPROFEN 200 MG
400 TABLET ORAL EVERY 6 HOURS PRN
COMMUNITY
End: 2019-11-21 | Stop reason: HOSPADM

## 2019-11-21 RX ORDER — FLUMAZENIL 0.1 MG/ML
0.2 INJECTION INTRAVENOUS AS NEEDED
Status: DISCONTINUED | OUTPATIENT
Start: 2019-11-21 | End: 2019-11-21 | Stop reason: HOSPADM

## 2019-11-21 RX ORDER — GLYCOPYRROLATE 0.2 MG/ML
INJECTION INTRAMUSCULAR; INTRAVENOUS AS NEEDED
Status: DISCONTINUED | OUTPATIENT
Start: 2019-11-21 | End: 2019-11-21 | Stop reason: SURG

## 2019-11-21 RX ORDER — PROMETHAZINE HYDROCHLORIDE 25 MG/1
25 SUPPOSITORY RECTAL ONCE AS NEEDED
Status: DISCONTINUED | OUTPATIENT
Start: 2019-11-21 | End: 2019-11-21 | Stop reason: SDUPTHER

## 2019-11-21 RX ORDER — SODIUM CHLORIDE 0.9 % (FLUSH) 0.9 %
3-10 SYRINGE (ML) INJECTION AS NEEDED
Status: DISCONTINUED | OUTPATIENT
Start: 2019-11-21 | End: 2019-11-21 | Stop reason: HOSPADM

## 2019-11-21 RX ORDER — ONDANSETRON 2 MG/ML
4 INJECTION INTRAMUSCULAR; INTRAVENOUS ONCE AS NEEDED
Status: DISCONTINUED | OUTPATIENT
Start: 2019-11-21 | End: 2019-11-21 | Stop reason: HOSPADM

## 2019-11-21 RX ORDER — LIDOCAINE HYDROCHLORIDE 20 MG/ML
INJECTION, SOLUTION INFILTRATION; PERINEURAL AS NEEDED
Status: DISCONTINUED | OUTPATIENT
Start: 2019-11-21 | End: 2019-11-21 | Stop reason: SURG

## 2019-11-21 RX ORDER — SCOLOPAMINE TRANSDERMAL SYSTEM 1 MG/1
1 PATCH, EXTENDED RELEASE TRANSDERMAL ONCE
Status: DISCONTINUED | OUTPATIENT
Start: 2019-11-21 | End: 2019-11-21 | Stop reason: HOSPADM

## 2019-11-21 RX ORDER — CEFAZOLIN SODIUM 2 G/100ML
2 INJECTION, SOLUTION INTRAVENOUS ONCE
Status: COMPLETED | OUTPATIENT
Start: 2019-11-21 | End: 2019-11-21

## 2019-11-21 RX ORDER — FLUMAZENIL 0.1 MG/ML
0.2 INJECTION INTRAVENOUS AS NEEDED
Status: DISCONTINUED | OUTPATIENT
Start: 2019-11-21 | End: 2019-11-21 | Stop reason: SDUPTHER

## 2019-11-21 RX ORDER — DEXAMETHASONE SODIUM PHOSPHATE 10 MG/ML
INJECTION INTRAMUSCULAR; INTRAVENOUS AS NEEDED
Status: DISCONTINUED | OUTPATIENT
Start: 2019-11-21 | End: 2019-11-21 | Stop reason: SURG

## 2019-11-21 RX ORDER — DIPHENHYDRAMINE HYDROCHLORIDE 50 MG/ML
12.5 INJECTION INTRAMUSCULAR; INTRAVENOUS
Status: DISCONTINUED | OUTPATIENT
Start: 2019-11-21 | End: 2019-11-21 | Stop reason: HOSPADM

## 2019-11-21 RX ORDER — ONDANSETRON 2 MG/ML
INJECTION INTRAMUSCULAR; INTRAVENOUS AS NEEDED
Status: DISCONTINUED | OUTPATIENT
Start: 2019-11-21 | End: 2019-11-21 | Stop reason: SURG

## 2019-11-21 RX ORDER — HYDROCODONE BITARTRATE AND ACETAMINOPHEN 7.5; 325 MG/1; MG/1
1 TABLET ORAL ONCE AS NEEDED
Status: COMPLETED | OUTPATIENT
Start: 2019-11-21 | End: 2019-11-21

## 2019-11-21 RX ADMIN — SCOPOLAMINE 1 PATCH: 1 PATCH TRANSDERMAL at 07:44

## 2019-11-21 RX ADMIN — FAMOTIDINE 20 MG: 10 INJECTION INTRAVENOUS at 09:48

## 2019-11-21 RX ADMIN — PROPOFOL 200 MG: 10 INJECTION, EMULSION INTRAVENOUS at 10:34

## 2019-11-21 RX ADMIN — ONDANSETRON HYDROCHLORIDE 4 MG: 2 SOLUTION INTRAMUSCULAR; INTRAVENOUS at 10:59

## 2019-11-21 RX ADMIN — FENTANYL CITRATE 50 MCG: 50 INJECTION INTRAMUSCULAR; INTRAVENOUS at 10:40

## 2019-11-21 RX ADMIN — FENTANYL CITRATE 50 MCG: 50 INJECTION INTRAMUSCULAR; INTRAVENOUS at 12:05

## 2019-11-21 RX ADMIN — LIDOCAINE HYDROCHLORIDE 3 ML: 10 INJECTION, SOLUTION INFILTRATION; PERINEURAL at 09:05

## 2019-11-21 RX ADMIN — FENTANYL CITRATE 50 MCG: 50 INJECTION INTRAMUSCULAR; INTRAVENOUS at 10:37

## 2019-11-21 RX ADMIN — GLYCOPYRROLATE 0.1 MG: 0.2 INJECTION INTRAMUSCULAR; INTRAVENOUS at 10:30

## 2019-11-21 RX ADMIN — CEFAZOLIN SODIUM 2 G: 2 INJECTION, SOLUTION INTRAVENOUS at 10:28

## 2019-11-21 RX ADMIN — SODIUM CHLORIDE, POTASSIUM CHLORIDE, SODIUM LACTATE AND CALCIUM CHLORIDE 9 ML/HR: 600; 310; 30; 20 INJECTION, SOLUTION INTRAVENOUS at 12:17

## 2019-11-21 RX ADMIN — FENTANYL CITRATE 50 MCG: 50 INJECTION INTRAMUSCULAR; INTRAVENOUS at 11:42

## 2019-11-21 RX ADMIN — DIAZEPAM 10 MG: 5 TABLET ORAL at 07:40

## 2019-11-21 RX ADMIN — HYDROCODONE BITARTRATE AND ACETAMINOPHEN 1 TABLET: 7.5; 325 TABLET ORAL at 11:41

## 2019-11-21 RX ADMIN — SODIUM CHLORIDE, POTASSIUM CHLORIDE, SODIUM LACTATE AND CALCIUM CHLORIDE 100 ML/HR: 600; 310; 30; 20 INJECTION, SOLUTION INTRAVENOUS at 09:48

## 2019-11-21 RX ADMIN — DEXAMETHASONE SODIUM PHOSPHATE 8 MG: 10 INJECTION INTRAMUSCULAR; INTRAVENOUS at 10:37

## 2019-11-21 RX ADMIN — LIDOCAINE HYDROCHLORIDE 100 MG: 20 INJECTION, SOLUTION INFILTRATION; PERINEURAL at 10:34

## 2019-11-21 NOTE — NURSING NOTE
Call placed to pharmacy to request status of pt prescriptions. Spoke to Destinee, she reports she is about to bring medications to pt's room.

## 2019-11-21 NOTE — H&P
There are no changes in the history or physical exam, aside from any that are listed as an addendum at the bottom of this document.   Today, patient will go for the surgery listed in the plan below.    Chief Complaint: Mabel Granger is a 59 y.o. female who was seen in consultation at the request of Nayana Cadena MD  for abnormal breast imaging    History of Present Illness:  Patient presents with Atypical duct hyperplasia left breast.. She noted no new masses, skin changes, nipple discharge, nipple changes prior to her most recent imaging.    Her most recent imaging includes the followin2019  [facility]    Mammo  Mabel Granger  MAMMO SCREEN MOBILE BILAT  Compared to prior imaging studies dated 2018.  Scattered areas of fibroglandular densities.  Calcifications with grouped distribution seen in the left breast at 6:00.  BIRADS Category: 0  MOBILE BILAT SCREENING  Scattered areas of fibroglandular densities.  There is no mammographic evidence of malignancy.  BIRADS Category: 1    2019  [facility]  Mammo  Mabel Reyezer  DIAGNOSTIC LEFT AMALIA AND 3D JOSE LUIS  Subtle group of amorphous calcifications in the lower, slightly inner left breast, middle depth.  DIAGNOSTIC CATEGORY: 4    2019  [facility]   Radiology  [facility]  MRI BREAST BILATERAL  Metallic clip artifact lower-inner quadrant of left breast. No areas of abnormal enhancement area seen in the vicinity of the metallic clip or in the left breast.  There are no findings suspicious for malignancy in either breast.  BIRADS Category: 2, Benign findings    She had a biopsy on the following day that showed:   2019  [facility]  Radiology  Mabel Boston  STEREO BREAST BX/ LT CALC  9 gauge.  Twelve specimens. A stoplight shaped clip was then placed. The clip was observed to fall out. 1 subsequently used free hand technique to place a ribbon clip, since the Z coordinate was 1.5 cm. The biopsy clip was in expected  position in the biopsy bed.    08/30/2019  [facility]  Radiology  Mabel Granger  DIGITAL DIAGNOSTIC POST BIOPSY  Ribbon clip was in satisfactory position in the biopsy bed.    08/30/2019  [facility]  Pathology  Mabel Granger  Left breast, lower inner quadrant stereotactic-guided core biopsy:  - Focal atypical ductal hyperplasia (ADH)  - Mild usual ductal hyperplasia  - Duct octasia  - Microcalcifications  - Negative for Malignancy        She has had her uterus and ovaries removed, is postmenopausal, and takes nor hormones.  She took estrogen alone patch for approximately 3 years, approximately 8 years ago.  She had a hysterectomy and oophorectomy in 2006 for cysts.  Her family history includes the following: She has 1 daughter, 1 sister, no maternal aunts, one paternal aunt.  No family history of breast or ovarian cancer.  She is here for evaluation.    Review of Systems:  Review of Systems   Endocrine: Positive for hot flashes.   Musculoskeletal: Positive for arthralgias and back pain.   All other systems reviewed and are negative.       Past Medical and Surgical History:  Breast Biopsy History:  Patient has had the following breast biopsies:8/30/2019 LEFT STEREOTACTIC  Breast Cancer HIstory:  Patient does not have a past medical history of breast cancer.  Breast Operations, and year:  NONE  Obstetric/Gynecologic History:  Age menstrual periods began: 11  Patient is postmenopausal due to removal of her uterus and both ovaries in the following year: 2006   Number of pregnancies:1  Number of live births: 1  Number of abortions or miscarriages: 0  Age of delivery of first child: 32  Patient breast fed, for the following lenth of time: 3 MONTHS   Length of time taking birth control pills: 3-4 YRS   Patient took hormone replacement during the following dates: 3 YRS   PATIENT HAD UTERUS AND OVARIES REMOVED 2006.    Past Surgical History:   Procedure Laterality Date   • BUNIONECTOMY     • HYSTERECTOMY     •  "MAMMO STEREOTACTIC BREAST BIOPSY 1ST W WO DEVICE     • TUBAL ABDOMINAL LIGATION         History reviewed. No pertinent past medical history.    Prior Hospitalizations, other than for surgery or childbirth, and year:  NONE     Social History     Socioeconomic History   • Marital status:      Spouse name: Not on file   • Number of children: Not on file   • Years of education: Not on file   • Highest education level: Not on file   Tobacco Use   • Smoking status: Former Smoker     Types: Cigarettes     Start date:      Last attempt to quit:      Years since quittin.8   • Smokeless tobacco: Never Used   Substance and Sexual Activity   • Alcohol use: Yes     Comment: 3-4 x per year   • Drug use: No     Patient is .  Patient is employed full time with the following occupation: [employer]  Patient drinks 2 servings of caffeine per day.    Family History:  Family History   Problem Relation Age of Onset   • Colon cancer Mother 55   • Pancreatic cancer Maternal Grandmother 80   • Throat cancer Maternal Grandfather 75       Vital Signs:  /72 (BP Location: Left arm, Patient Position: Sitting, Cuff Size: Adult)   Pulse 69   Ht 172.7 cm (68\")   Wt 105 kg (232 lb)   LMP  (LMP Unknown)   SpO2 98%   Breastfeeding? No   BMI 35.28 kg/m²      Medications:  No current outpatient medications on file.     Allergies:  No Known Allergies    Physical Examination:  /72 (BP Location: Left arm, Patient Position: Sitting, Cuff Size: Adult)   Pulse 69   Ht 172.7 cm (68\")   Wt 105 kg (232 lb)   LMP  (LMP Unknown)   SpO2 98%   Breastfeeding? No   BMI 35.28 kg/m²   General Appearance:  Patient is in no distress.  She is well kept and has an obese build.   Psychiatric:  Patient with appropriate mood and affect. Alert and oriented to self, time, and place.    Breast, RIGHT:  large sized, 40 4D, symmetric with the contralateral side.  Breast skin is without erythema, edema, rashes.  There are no " visible abnormalities upon inspection during the arm-raising maneuver or with hands on hips in the sitting position. There is no nipple retraction, discharge or nipple/areolar skin changes.There are no masses palpable in the sitting or supine positions.    Breast, LEFT:  large sized, 80 4D, symmetric with the contralateral side.  Breast skin is without erythema, edema, rashes.  There are no visible abnormalities upon inspection during the arm-raising maneuver or with hands on hips in the sitting position. There is no nipple retraction, discharge or nipple/areolar skin changes.There are no masses palpable in the sitting or supine positions.  Well-healed mammotomy from her stereotactic biopsy with no erythema warmth or drainage at the 6:00 inner ring location.    Lymphatic:  There is no axillary, cervical, infraclavicular, or supraclavicular adenopathy bilaterally.  Eyes:  Pupils are round and reactive to light.  Cardiovascular:  Heart rate and rhythm are regular.  Respiratory:  Lungs are clear bilaterally with no crackles or wheezes in any lung field.  Gastrointestinal:  Abdomen is soft, nondistended, and nontender. *    Musculoskeletal:  Good strength in all 4 extremities.   There is good range of motion in both shoulders.    Skin:  No new skin lesions or rashes on the skin excluding the breast (see breast exam above).        Imagin2019  [facility]    [employer]  Mabel Granger  MAMMO SCREEN MOBILE BILAT  Compared to prior imaging studies dated 2018.  Scattered areas of fibroglandular densities.  Calcifications with grouped distribution seen in the left breast at 6:00.  BIRADS Category: 0  MOBILE BILAT SCREENING  Scattered areas of fibroglandular densities.  There is no mammographic evidence of malignancy.  BIRADS Category: 1    2019  [facility]  Mammo  Mabel Granger  DIAGNOSTIC LEFT AMALIA AND 3D JOSE LUIS  Subtle group of amorphous calcifications in the lower, slightly inner left breast, middle  depth.  DIAGNOSTIC CATEGORY: 4    09/24/2019  [facility]   Radiology  Mabel Crawford  MRI BREAST BILATERAL  Metallic clip artifact lower-inner quadrant of left breast. No areas of abnormal enhancement area seen in the vicinity of the metallic clip or in the left breast.  There are no findings suspicious for malignancy in either breast.  BIRADS Category: 2, Benign findings    Pathology:  08/30/2019  [facility]  Radiology  [facility]  STEREO BREAST BX/ LT CALC  9 gauge.  Twelve specimens. A stoplight shaped clip was then placed. The clip was observed to fall out. 1 subsequently used free hand technique to place a ribbon clip, since the Z coordinate was 1.5 cm. The biopsy clip was in expected position in the biopsy bed.    08/30/2019  [facility]  DIGITAL DIAGNOSTIC POST BIOPSY  Ribbon clip was in satisfactory position in the biopsy bed.    08/30/2019  [facility]  Pathology  [facility]  Left breast, lower inner quadrant stereotactic-guided core biopsy:  - Focal atypical ductal hyperplasia (ADH)  - Mild usual ductal hyperplasia  - Duct octasia  - Microcalcifications  - Negative for Malignancy    Procedures:      Assessment:   Diagnosis Plan   1. Atypical ductal hyperplasia of breast  Ambulatory Referral to Nutrition Services   2. Breast calcifications on mammogram     3. Obesity (BMI 35.0-39.9 without comorbidity)  Ambulatory Referral to Nutrition Services     1-2  LEFT breast lower inner quadrant calcifications on mammogram.  No residual calcifications and no residual enhancement on MRI.  Ribbon marker in good position.  Focal atypical duct hyperplasia, usual hyperplasia, duct ectasia and calcifications.  No family history    3-    BMI 35      Plan:  Mabel and KATLIN reviewed her history, imaging, imaging reports and examination together today.  We discussed that atypical hyperplasia, LCIS and family history are the 3 strongest risk factors for the development  of  breast cancer outside of a known genetic predisposition. We discussed that for atypical hyperplasia that we recommend excision to ensure no pathologic upgrade and also to remove any remaining abnormal cells. We discussed that for a Whitney risk > 1.7, that we recommend consultation with medical oncology about risks and benefits of hormonal blockade. We discussed that for a risk (using a model that incorporates detailed family history) > 20% that we recommend MRI for surveillance.   Her Whitney 5 year risk is-1.4 to 7-1/2%.  Her lifetime risk of breast cancer using the [facility]- [facility] model is-8 to 36%.  Based on the above, I have recommended the following:  #1 left breast needle localized lumpectomy for atypical duct hyperplasia.    #2 annual MRI along with mammography for surveillance.  Her next mammogram would be due July 25, 2020 and her next MRI would be due September 26, 2020 so we will plan to do those both together.  3.  Risk reduction: We discussed meeting with a medical oncologist to talk about medicines that reduce breast cancer risk.  We also discussed weight loss.  At this point she has declined a consultation with medical oncology.  She is already in the process of trying to lose weight.  She has gained 40 pounds over the past several years and recently has lost 20 pounds over the past 1 year.  I offered her a consultation with our nutritionist which she would like to pursue so we will schedule this for her.  We discussed the risks of bleeding, infection with the procedure.  She will schedule at her convenience with Kayleen Quach MD        We have spent 60 minutes in visit today, 45 in face to face .      Next Appointment:  Return for surgery.      EMR Dragon/transcription disclaimer:    Much of this encounter note is an electronic transcription/translocation of spoken language to printed text.  The electronic translation of spoken language may permit erroneous, or at times,  nonsensical words or phrases to be inadvertently transcribed.  Although I have reviewed the note from such areas, some may still exist.

## 2019-11-21 NOTE — ANESTHESIA PROCEDURE NOTES
Airway  Urgency: elective    Date/Time: 11/21/2019 10:35 AM  Airway not difficult    General Information and Staff    Patient location during procedure: OR  Anesthesiologist: Render, Eh Ray, MD  CRNA: Radha Pierce CRNA    Indications and Patient Condition  Indications for airway management: airway protection    Preoxygenated: yes  Mask difficulty assessment: 0 - not attempted    Final Airway Details  Final airway type: supraglottic airway      Successful airway: unique  Size 4    Number of attempts at approach: 1  Assessment: lips, teeth, and gum same as pre-op and atraumatic intubation

## 2019-11-21 NOTE — DISCHARGE INSTRUCTIONS
Dr. Nya Quach  [address]  [phone]    Lumpectomy, [place] Node Biopsy, Excisional Breast Biopsy, Port Placement  Postoperative Discharge Instructions         Keep 6 inch ace wrap in place and dressings dry postoperatively for a total of 72 hours. May then remove dressings and ACE wrap. Can shower and get affected area wet after dressings are removed. Prefer no soaking in the tub for one week. Leave steri-strips in place.   A responsible adult should accompany the patient on discharge and for 24 hours following surgery.   No heavy lifting (>5 lbs) or strenuous upper arm activity, and no raising of arms over the head until followup appointment.   For 24 hours postoperatively, or while taking pain or nausea medications - Do not drive, operate machinery or drink alcohol.   Call the office for any of the following symptoms:    Persistent bleeding   Excessive bruising or swelling   Excessive sleepiness or trouble breathing   Severe pain   Persistent nausea or vomiting   Fever greater than 101.   Patient should keep the postoperative visit that was scheduled for him/her in [facility]. If the patient has forgotten this date, please call the clinic for a reminder of the appointment time. If it is after hours, the patient can call the clinic the next business day for this reminder. [facility] phone number is [phone].

## 2019-11-21 NOTE — ANESTHESIA POSTPROCEDURE EVALUATION
Patient: Mabel Granger    Procedure Summary     Date:  11/21/19 Room / Location:   [facility]  /  [facility]    Anesthesia Start:  1027 Anesthesia Stop:  1118    Procedure:  left breast needle localized lumpectomy for atypical duct hyperplasia. (Left Breast) Diagnosis:       Atypical ductal hyperplasia of breast      (Atypical ductal hyperplasia of breast [N60.99])    Surgeon:  Nya Quach MD Provider:  Render, Eh Glass MD    Anesthesia Type:  general ASA Status:  2          Anesthesia Type: general  Last vitals  BP   121/69 (11/21/19 1320)   Temp   36.1 °C (97 °F) (11/21/19 1120)   Pulse   53 (11/21/19 1320)   Resp   16 (11/21/19 1320)     SpO2   99 % (11/21/19 1320)     Post Anesthesia Care and Evaluation    Patient location during evaluation: bedside  Patient participation: complete - patient participated  Level of consciousness: awake and alert  Pain management: adequate  Airway patency: patent  Anesthetic complications: No anesthetic complications    Cardiovascular status: acceptable  Respiratory status: acceptable  Hydration status: acceptable    Comments: /69 (BP Location: Right arm, Patient Position: Sitting)   Pulse 53   Temp 36.1 °C (97 °F) (Oral)   Resp 16   Wt 105 kg (230 lb 13.2 oz)   LMP  (LMP Unknown)   SpO2 99%   BMI 35.10 kg/m²

## 2019-11-21 NOTE — NURSING NOTE
Call placed to [facility] ([facility] to request status of pt's prescriptions. He reports they have received the faxed prescriptions and the medications are in process.

## 2019-11-21 NOTE — OP NOTE
Operative note    Preoperative Diagnosis: Atypical Hyperplasia, Ductal    Postoperative Diagnosis: Atypical Hyperplasia, Ductal    Procedure Performed: left breast needle localized lumpectomy     Dictating physician and surgeon: Nya Quach MD    EBL:5cc    FINDINGS AND DESCRIPTION OF PROCEDURE:     The patient was brought to the operating room and placed on the table in the supine position. After adequate general LMA anesthesia was obtained, we sterilly prepped and draped the breast and axilla. We did a time out to identify correct patient and correct operative site.  She did receive IV antibiotic within an hour of and prior to incision.       I marked the intended area for resection and planned my incision based on the mammographic localization imaging. The localization need was the following length: 3cm     I included an ellipse of skin overlying the tumor for margins and orientation.    Incision was inferior radial.    I used a 15 blade to incise the skin. I then dissected using bovie electrocautery superiorly, inferiorly, medially and laterally around the lesion.  I examined the specimen using the intraoperative faxitron to document the presence of the lesion within the specimen. No additional margins taken     I then passed the specimens and labelled as follows: For the lumpectomy,  long stitch lateral short stitch superior, double stitch deep.     I then irrigated, assured hemostasis.  I then mobilized the breast posteriorly and closed a deep layer of breast tissue using a running 2-0 vicryl, followed by a superficial layer of breast using a running 2-0 vicryl. I then closed the skin in 2 layers- the first being n interrupted 3-0 vicryl layer, followed by a running 4-0 monocryl.    I then applied lengthwise 1/2 inch steri strips, an island dressing.  Then I wrapped her in 4x4s and a 6 inch ACE wrap.    She tolerated the procedure well, there were no immediate complications, and all counts were correct  at the end of the case.

## 2019-11-25 ENCOUNTER — TELEPHONE (OUTPATIENT)
Dept: SURGERY | Facility: CLINIC | Age: 59
End: 2019-11-25

## 2019-11-25 LAB
CYTO UR: NORMAL
LAB AP CASE REPORT: NORMAL
LAB AP CLINICAL INFORMATION: NORMAL
LAB AP DIAGNOSIS COMMENT: NORMAL
PATH REPORT.FINAL DX SPEC: NORMAL
PATH REPORT.GROSS SPEC: NORMAL

## 2019-11-25 NOTE — TELEPHONE ENCOUNTER
October 18, 2019 left breast needle localized lumpectomy for atypical duct hyperplasia returned as atypical duct hyperplasia no evidence for in situ or invasive carcinoma.  Biopsy site and clip identified.  We will call and let her know.    Final Diagnosis   1.  Left Breast, Oriented Needle-Localization Lumpectomy (17 grams): ATYPICAL DUCTAL HYPERPLASIA (ADH) WITH ASSOCIATED CALCIFICATIONS.               A.  ADH present in 2 of 16 blocks of breast tissue with the largest focus measuring 1.8mm maximally.                B.  Biopsy site identified and clip retrieved.                C.  Unremarkable overlying skin.                 D.  Foci of duct hyperplasia, usual type, and micropapilloma.               E.  No evidence of in situ nor invasive carcinoma identified.                   swm/brb

## 2019-12-04 ENCOUNTER — OFFICE VISIT (OUTPATIENT)
Dept: SURGERY | Facility: CLINIC | Age: 59
End: 2019-12-04

## 2019-12-04 VITALS
SYSTOLIC BLOOD PRESSURE: 126 MMHG | OXYGEN SATURATION: 96 % | HEART RATE: 68 BPM | WEIGHT: 231 LBS | BODY MASS INDEX: 35.01 KG/M2 | DIASTOLIC BLOOD PRESSURE: 84 MMHG | HEIGHT: 68 IN

## 2019-12-04 DIAGNOSIS — E66.9 OBESITY (BMI 35.0-39.9 WITHOUT COMORBIDITY): ICD-10-CM

## 2019-12-04 DIAGNOSIS — R92.1 BREAST CALCIFICATIONS ON MAMMOGRAM: ICD-10-CM

## 2019-12-04 DIAGNOSIS — N60.99 ATYPICAL DUCTAL HYPERPLASIA OF BREAST: Primary | ICD-10-CM

## 2019-12-04 PROCEDURE — 99024 POSTOP FOLLOW-UP VISIT: CPT | Performed by: SURGERY

## 2019-12-04 NOTE — PROGRESS NOTES
Chief Complaint: Mabel Granger is a 59 y.o. female who was seen in consultation at the request of Nayana Cadena MD  for abnormal breast imaging and a postoperative visit    History of Present Illness:  Patient presents with Atypical duct hyperplasia left breast.. She noted no new masses, skin changes, nipple discharge, nipple changes prior to her most recent imaging.    Her most recent imaging includes the followin2019  [facility]    Mammo  Mabel Granger  MAMMO SCREEN MOBILE BILAT  Compared to prior imaging studies dated 2018.  Scattered areas of fibroglandular densities.  Calcifications with grouped distribution seen in the left breast at 6:00.  BIRADS Category: 0  MOBILE BILAT SCREENING  Scattered areas of fibroglandular densities.  There is no mammographic evidence of malignancy.  BIRADS Category: 1    2019  [facility]  Mammo  Mabel Reyezer  DIAGNOSTIC LEFT AMALIA AND 3D JOSE LUIS  Subtle group of amorphous calcifications in the lower, slightly inner left breast, middle depth.  DIAGNOSTIC CATEGORY: 4    2019  [facility]   Radiology  [facility]  MRI BREAST BILATERAL  Metallic clip artifact lower-inner quadrant of left breast. No areas of abnormal enhancement area seen in the vicinity of the metallic clip or in the left breast.  There are no findings suspicious for malignancy in either breast.  BIRADS Category: 2, Benign findings    She had a biopsy on the following day that showed:   2019  [facility]  [place]abel [place]  STEREO BREAST BX/ LT CALC  9 gauge.  Twelve specimens. A stoplight shaped clip was then placed. The clip was observed to fall out. 1 subsequently used free hand technique to place a ribbon clip, since the Z coordinate was 1.5 cm. The biopsy clip was in expected position in the biopsy bed.    2019  [facility]  DIGITAL DIAGNOSTIC POST BIOPSY  Ribbon clip was in satisfactory position in the biopsy  bed.    08/30/2019  [facility]  Pathology  Mabel Granger  Left breast, lower inner quadrant stereotactic-guided core biopsy:  - Focal atypical ductal hyperplasia (ADH)  - Mild usual ductal hyperplasia  - Duct octasia  - Microcalcifications  - Negative for Malignancy        She has had her uterus and ovaries removed, is postmenopausal, and takes nor hormones.  She took estrogen alone patch for approximately 3 years, approximately 8 years ago.  She had a hysterectomy and oophorectomy in 2006 for cysts.  Her family history includes the following: She has 1 daughter, 1 sister, no maternal aunts, one paternal aunt.  No family history of breast or ovarian cancer.    Interval History:  October 18, 2019 left breast needle localized lumpectomy for atypical duct hyperplasia returned as atypical duct hyperplasia no evidence for in situ or invasive carcinoma.  Biopsy site and clip identified.    Denies redness, warmth, drainage from incision.        Review of Systems:  Review of Systems   Constitutional: Negative for unexpected weight change (1 lb wt loss).   Endocrine: Positive for hot flashes.   Musculoskeletal: Positive for arthralgias and back pain.   All other systems reviewed and are negative.       Past Medical and Surgical History:  Breast Biopsy History:  Patient has had the following breast biopsies:8/30/2019 LEFT STEREOTACTIC  Breast Cancer HIstory:  Patient does not have a past medical history of breast cancer.  Breast Operations, and year:  NONE  Obstetric/Gynecologic History:  Age menstrual periods began: 11  Patient is postmenopausal due to removal of her uterus and both ovaries in the following year: 2006   Number of pregnancies:1  Number of live births: 1  Number of abortions or miscarriages: 0  Age of delivery of first child: 32  Patient breast fed, for the following lenth of time: 3 MONTHS   Length of time taking birth control pills: 3-4 YRS   Patient took hormone replacement during the following  "dates: 3 YRS   PATIENT HAD UTERUS AND OVARIES REMOVED .    Past Surgical History:   Procedure Laterality Date   • BREAST LUMPECTOMY Left 2019    Procedure: left breast needle localized lumpectomy for atypical duct hyperplasia.;  Surgeon: Nya Quach MD;  [facility]: [facility];  Service: General   • BUNIONECTOMY     • HYSTERECTOMY     • MAMMO STEREOTACTIC BREAST BIOPSY 1ST W WO DEVICE     • TUBAL ABDOMINAL LIGATION         Past Medical History:   Diagnosis Date   • Breast atypical hyperplasia     LEFT   • PONV (postoperative nausea and vomiting)        Prior Hospitalizations, other than for surgery or childbirth, and year:  NONE     Social History     Socioeconomic History   • Marital status:      Spouse name: Not on file   • Number of children: Not on file   • Years of education: Not on file   • Highest education level: Not on file   Tobacco Use   • Smoking status: Former Smoker     Types: Cigarettes     Start date:      Last attempt to quit:      Years since quittin.9   • Smokeless tobacco: Never Used   Substance and Sexual Activity   • Alcohol use: Yes     Comment: 3-4 x per year   • Drug use: No   • Sexual activity: Defer     Patient is .  Patient is employed full time with the following occupation: [employer]  Patient drinks 2 servings of caffeine per day.    Family History:  Family History   Problem Relation Age of Onset   • Colon cancer Mother 55   • Pancreatic cancer Maternal Grandmother 80   • Throat cancer Maternal Grandfather 75   • Malig Hyperthermia Neg Hx        Vital Signs:  /84 (BP Location: Left arm, Patient Position: Sitting, Cuff Size: Adult)   Pulse 68   Ht 172.7 cm (68\")   Wt 105 kg (231 lb)   LMP  (LMP Unknown)   SpO2 96%   Breastfeeding? No   BMI 35.12 kg/m²      Medications:    Current Outpatient Medications:   •  acetaminophen (TYLENOL) 500 MG tablet, Take 1,000 mg by mouth Every 6 (Six) Hours As Needed for Mild Pain ., Disp: , Rfl: " "  •  HYDROcodone-acetaminophen (NORCO) 5-325 MG per tablet, Take 1-2 tablets by mouth Every 4 to 6 Hours As Needed for pain., Disp: 25 tablet, Rfl: 0  •  ondansetron (ZOFRAN) 4 MG tablet, Take 1-2 tablets by mouth Every 8 (Eight) Hours As Needed for nausea., Disp: 10 tablet, Rfl: 0     Allergies:  No Known Allergies    Physical Examination:  /84 (BP Location: Left arm, Patient Position: Sitting, Cuff Size: Adult)   Pulse 68   Ht 172.7 cm (68\")   Wt 105 kg (231 lb)   LMP  (LMP Unknown)   SpO2 96%   Breastfeeding? No   BMI 35.12 kg/m²   General Appearance:  Patient is in no distress.  She is well kept and has an obese build.   Psychiatric:  Patient with appropriate mood and affect. Alert and oriented to self, time, and place.    Breast, RIGHT:  large sized, 40 4D, symmetric with the contralateral side.  Breast skin is without erythema, edema, rashes.  There are no visible abnormalities upon inspection during the arm-raising maneuver or with hands on hips in the sitting position. There is no nipple retraction, discharge or nipple/areolar skin changes.There are no masses palpable in the sitting or supine positions.    Breast, LEFT:  large sized, 80 4D, symmetric with the contralateral side.  Breast skin is without erythema, edema, rashes.  There are no visible abnormalities upon inspection during the arm-raising maneuver or with hands on hips in the sitting position. There is no nipple retraction, discharge or nipple/areolar skin changes.There are no masses palpable in the sitting or supine positions.  Well-healing radial incision left breast 6:00 from her November 2019 excision of atypical duct hyperplasia.  No erythema warmth or drainage    Lymphatic:  There is no axillary, cervical, infraclavicular, or supraclavicular adenopathy bilaterally.  Eyes:  Pupils are round and reactive to light.  Cardiovascular:  Heart rate and rhythm are regular.  Respiratory:  Lungs are clear bilaterally with no crackles or " wheezes in any lung field.  Gastrointestinal:  Abdomen is soft, nondistended, and nontender.     Musculoskeletal:  Good strength in all 4 extremities.   There is good range of motion in both shoulders.    Skin:  No new skin lesions or rashes on the skin excluding the breast (see breast exam above).        Imagin2019  [facility]    Mammo  [facility]  MAMMO SCREEN MOBILE BILAT  Compared to prior imaging studies dated 2018.  Scattered areas of fibroglandular densities.  Calcifications with grouped distribution seen in the left breast at 6:00.  BIRADS Category: 0  MOBILE BILAT SCREENING  Scattered areas of fibroglandular densities.  There is no mammographic evidence of malignancy.  BIRADS Category: 1    2019  [facility]  Mammo  [facility]  DIAGNOSTIC LEFT AMALIA AND 3D JOSE LUIS  Subtle group of amorphous calcifications in the lower, slightly inner left breast, middle depth.  DIAGNOSTIC CATEGORY: 4    2019  [facility]   Radiology  [facility]  MRI BREAST BILATERAL  Metallic clip artifact lower-inner quadrant of left breast. No areas of abnormal enhancement area seen in the vicinity of the metallic clip or in the left breast.  There are no findings suspicious for malignancy in either breast.  BIRADS Category: 2, Benign findings    Pathology:  2019  [facility]  Radiology  [facility]  STEREO BREAST BX/ LT CALC  9 gauge.  Twelve specimens. A stoplight shaped clip was then placed. The clip was observed to fall out. 1 subsequently used free hand technique to place a ribbon clip, since the Z coordinate was 1.5 cm. The biopsy clip was in expected position in the biopsy bed.    2019  [facility]  Radiology  [facility]  DIGITAL DIAGNOSTIC POST BIOPSY  Ribbon clip was in satisfactory position in the biopsy bed.    2019  [facility]  Pathology  [facility]  Left breast, lower inner quadrant stereotactic-guided core biopsy:  - Focal atypical ductal  hyperplasia (ADH)  - Mild usual ductal hyperplasia  - Duct octasia  - Microcalcifications  - Negative for Malignancy    October 18, 2019 left breast needle localized lumpectomy for atypical duct hyperplasia returned as atypical duct hyperplasia no evidence for in situ or invasive carcinoma.  Biopsy site and clip identified.  We will call and let her know.     Final Diagnosis   1.  Left Breast, Oriented Needle-Localization Lumpectomy (17 grams): ATYPICAL DUCTAL HYPERPLASIA (ADH) WITH ASSOCIATED CALCIFICATIONS.               A.  ADH present in 2 of 16 blocks of breast tissue with the largest focus measuring 1.8mm maximally.                B.  Biopsy site identified and clip retrieved.                C.  Unremarkable overlying skin.                 D.  Foci of duct hyperplasia, usual type, and micropapilloma.               E.  No evidence of in situ nor invasive carcinoma identified.       swm/brb             Procedures:      Assessment:   Diagnosis Plan   1. Atypical ductal hyperplasia of breast  MRI Breast Bilateral With & Without Contrast    Mammo Screening Digital Tomosynthesis Bilateral With CAD   2. Breast calcifications on mammogram     3. Obesity (BMI 35.0-39.9 without comorbidity)       1-2  Right breast lower inner quadrant calcifications on mammogram.  No residual calcifications and no residual enhancement on MRI.  Ribbon marker in good position.  Focal atypical duct hyperplasia, usual hyperplasia, duct ectasia and calcifications.  No family history    October 18, 2019 left breast needle localized lumpectomy for atypical duct hyperplasia returned as atypical duct hyperplasia no evidence for in situ or invasive carcinoma.  Biopsy site and clip identified.      3-    BMI 35      Plan:  Mabel and KATLIN reviewed her pathology and examination together today.  She is healing nicely.    Previously we discussed her risk assessment as follows:   Her Whitney 5 year risk is-1.4 to 7-1/2%.  Her lifetime risk of breast  cancer using the CRA pro- Tyrer Nemock model is-8 to 36%.    She declined a consultation with medical oncology for risk reduction but did say that she would pursue weight loss.  Our nutritionist has called her and she is going to get back in touch with her.  We discussed annual MRI and mammographic surveillance and we will plan for both of those together at [facility] September 26, 2020.  I asked her to continue her self breast exam and to call us in the interim with concerns otherwise we will see her back next fall after the above imaging.      Nya Quach MD      Next Appointment:  Return for Next scheduled follow up, after imaging.      EMR Dragon/transcription disclaimer:    Much of this encounter note is an electronic transcription/translocation of spoken language to printed text.  The electronic translation of spoken language may permit erroneous, or at times, nonsensical words or phrases to be inadvertently transcribed.  Although I have reviewed the note from such areas, some may still exist.

## 2019-12-06 ENCOUNTER — TELEPHONE (OUTPATIENT)
Dept: SURGERY | Facility: CLINIC | Age: 59
End: 2019-12-06

## 2019-12-06 NOTE — TELEPHONE ENCOUNTER
Bilateral 3D Screen Mammo and Bilateral Breast MRI at [facility]  9-28-20 arrive 12:45      Return to see Dr CROSS 10-13-20 arrive 12:45      LM for pt to call    luzmaria

## 2020-08-03 ENCOUNTER — HOSPITAL ENCOUNTER (OUTPATIENT)
Dept: GENERAL RADIOLOGY | Facility: HOSPITAL | Age: 60
Discharge: HOME | End: 2020-08-03
Attending: GENERAL PRACTICE

## 2020-08-03 LAB
25(OH)D3 SERPL-MCNC: 49.2 NG/ML (ref 30–100)
ALBUMIN SERPL-MCNC: 4.1 G/DL (ref 3.5–5)
ALBUMIN/GLOB SERPL: 1.4 {RATIO} (ref 1.4–2.6)
ALP SERPL-CCNC: 101 U/L (ref 53–141)
ALT SERPL-CCNC: 13 U/L (ref 10–40)
ANION GAP SERPL CALC-SCNC: 21 MMOL/L (ref 8–19)
AST SERPL-CCNC: 17 U/L (ref 15–50)
BASOPHILS # BLD AUTO: 0.02 10*3/UL (ref 0–0.2)
BASOPHILS NFR BLD AUTO: 0.3 % (ref 0–3)
BILIRUB SERPL-MCNC: 0.65 MG/DL (ref 0.2–1.3)
BUN SERPL-MCNC: 12 MG/DL (ref 5–25)
BUN/CREAT SERPL: 13 {RATIO} (ref 6–20)
CALCIUM SERPL-MCNC: 9.8 MG/DL (ref 8.7–10.4)
CHLORIDE SERPL-SCNC: 100 MMOL/L (ref 99–111)
CHOLEST SERPL-MCNC: 194 MG/DL (ref 107–200)
CHOLEST/HDLC SERPL: 4 {RATIO} (ref 3–6)
CK SERPL-CCNC: 58 U/L (ref 35–230)
CONV ABS IMM GRAN: 0.03 10*3/UL (ref 0–0.2)
CONV CO2: 23 MMOL/L (ref 22–32)
CONV IMMATURE GRAN: 0.4 % (ref 0–1.8)
CONV TOTAL PROTEIN: 7 G/DL (ref 6.3–8.2)
CREAT UR-MCNC: 0.89 MG/DL (ref 0.5–0.9)
DEPRECATED RDW RBC AUTO: 48.7 FL (ref 36.4–46.3)
EOSINOPHIL # BLD AUTO: 0.11 10*3/UL (ref 0–0.7)
EOSINOPHIL # BLD AUTO: 1.5 % (ref 0–7)
ERYTHROCYTE [DISTWIDTH] IN BLOOD BY AUTOMATED COUNT: 13.8 % (ref 11.7–14.4)
EST. AVERAGE GLUCOSE BLD GHB EST-MCNC: 114 MG/DL
GFR SERPLBLD BASED ON 1.73 SQ M-ARVRAT: >60 ML/MIN/{1.73_M2}
GLOBULIN UR ELPH-MCNC: 2.9 G/DL (ref 2–3.5)
GLUCOSE SERPL-MCNC: 84 MG/DL (ref 65–99)
HBA1C MFR BLD: 5.6 % (ref 3.5–5.7)
HCT VFR BLD AUTO: 43.8 % (ref 37–47)
HDLC SERPL-MCNC: 48 MG/DL (ref 40–60)
HGB BLD-MCNC: 14 G/DL (ref 12–16)
LDLC SERPL CALC-MCNC: 120 MG/DL (ref 70–100)
LYMPHOCYTES # BLD AUTO: 1.93 10*3/UL (ref 1–5)
LYMPHOCYTES NFR BLD AUTO: 25.7 % (ref 20–45)
MCH RBC QN AUTO: 30.8 PG (ref 27–31)
MCHC RBC AUTO-ENTMCNC: 32 G/DL (ref 33–37)
MCV RBC AUTO: 96.3 FL (ref 81–99)
MONOCYTES # BLD AUTO: 0.47 10*3/UL (ref 0.2–1.2)
MONOCYTES NFR BLD AUTO: 6.3 % (ref 3–10)
NEUTROPHILS # BLD AUTO: 4.94 10*3/UL (ref 2–8)
NEUTROPHILS NFR BLD AUTO: 65.8 % (ref 30–85)
NRBC CBCN: 0 % (ref 0–0.7)
OSMOLALITY SERPL CALC.SUM OF ELEC: 289 MOSM/KG (ref 273–304)
PLATELET # BLD AUTO: 304 10*3/UL (ref 130–400)
PMV BLD AUTO: 10.9 FL (ref 9.4–12.3)
POTASSIUM SERPL-SCNC: 4.3 MMOL/L (ref 3.5–5.3)
RBC # BLD AUTO: 4.55 10*6/UL (ref 4.2–5.4)
SODIUM SERPL-SCNC: 140 MMOL/L (ref 135–147)
TRIGL SERPL-MCNC: 129 MG/DL (ref 40–150)
TSH SERPL-ACNC: 1.68 M[IU]/L (ref 0.27–4.2)
VLDLC SERPL-MCNC: 26 MG/DL (ref 5–37)
WBC # BLD AUTO: 7.5 10*3/UL (ref 4.8–10.8)

## 2020-08-04 LAB — VIT B12 SERPL-MCNC: 467 PG/ML (ref 211–911)

## 2020-09-02 ENCOUNTER — TELEPHONE (OUTPATIENT)
Dept: SURGERY | Facility: CLINIC | Age: 60
End: 2020-09-02

## 2020-09-02 NOTE — TELEPHONE ENCOUNTER
Moved pt's appt off of 10-13-20 (provider on vacation)    Moved to 10-5-2020 arrive 1:15    Lm for pt to call and confirm  joe

## 2020-09-21 ENCOUNTER — HOSPITAL ENCOUNTER (OUTPATIENT)
Dept: GENERAL RADIOLOGY | Facility: HOSPITAL | Age: 60
Discharge: HOME | End: 2020-09-21
Attending: GENERAL PRACTICE

## 2020-09-21 LAB — FSH SERPL-ACNC: 93.8 M[IU]/ML

## 2020-09-22 LAB
ESTRADIOL SERPL HS-MCNC: 19.3 PG/ML
PROGEST SERPL-MCNC: 0.2 NG/ML

## 2020-09-25 LAB
CONV TESTOSTERONE, FREE: 3.5 PG/ML
TESTOST FREE MFR SERPL: 1.6 %
TESTOST SERPL-MCNC: 22 NG/DL

## 2020-09-28 ENCOUNTER — HOSPITAL ENCOUNTER (OUTPATIENT)
Dept: MAMMOGRAPHY | Facility: HOSPITAL | Age: 60
Discharge: HOME | End: 2020-09-28

## 2020-09-28 ENCOUNTER — TELEPHONE (OUTPATIENT)
Dept: SURGERY | Facility: CLINIC | Age: 60
End: 2020-09-28

## 2020-09-28 ENCOUNTER — HOSPITAL ENCOUNTER (OUTPATIENT)
Dept: MRI IMAGING | Facility: HOSPITAL | Age: 60
Discharge: HOME | End: 2020-09-28

## 2020-09-28 DIAGNOSIS — N60.99 ATYPICAL DUCTAL HYPERPLASIA OF BREAST: ICD-10-CM

## 2020-09-28 LAB — CREAT BLDA-MCNC: 0.9 MG/DL (ref 0.6–1.3)

## 2020-09-28 PROCEDURE — 77049 MRI BREAST C-+ W/CAD BI: CPT

## 2020-09-28 PROCEDURE — 82565 ASSAY OF CREATININE: CPT

## 2020-09-28 PROCEDURE — A9577 INJ MULTIHANCE: HCPCS | Performed by: SURGERY

## 2020-09-28 PROCEDURE — 0 GADOBENATE DIMEGLUMINE 529 MG/ML SOLUTION: Performed by: SURGERY

## 2020-09-28 PROCEDURE — 77067 SCR MAMMO BI INCL CAD: CPT

## 2020-09-28 PROCEDURE — 77063 BREAST TOMOSYNTHESIS BI: CPT

## 2020-09-28 RX ORDER — SIMVASTATIN 20 MG
TABLET ORAL
COMMUNITY
Start: 2020-08-03

## 2020-09-28 RX ORDER — LOSARTAN POTASSIUM 25 MG/1
25 TABLET ORAL DAILY
COMMUNITY
Start: 2020-07-29

## 2020-09-28 RX ADMIN — GADOBENATE DIMEGLUMINE 20 ML: 529 INJECTION, SOLUTION INTRAVENOUS at 14:42

## 2020-09-30 ENCOUNTER — TELEPHONE (OUTPATIENT)
Dept: SURGERY | Facility: CLINIC | Age: 60
End: 2020-09-30

## 2020-09-30 NOTE — TELEPHONE ENCOUNTER
Patient cancel follow up appt Friday 10/2/2020, due to work.     Follow up rescheduled Jan 18, 2020.

## 2020-09-30 NOTE — TELEPHONE ENCOUNTER
[facility] bilateral screening mammogram with tomosynthesis September 28, 2020.  Scattered fibroglandular densities.  BI-RADS 1.  Bilateral breast MRI September 28, 2020 [facility] no findings suspicious for malignancy in either breast bilateral fibrocystic changes are seen BI-RADS 2

## 2020-11-04 ENCOUNTER — TELEPHONE (OUTPATIENT)
Dept: SURGERY | Facility: CLINIC | Age: 60
End: 2020-11-04

## 2020-11-04 NOTE — TELEPHONE ENCOUNTER
Pt ok to see Chyna Martin NP    Scheduled for 2-9-21 arrive 1:45  (needed a Tuesday appt)    Jaqiu

## 2020-12-23 ENCOUNTER — CONVERSION ENCOUNTER (OUTPATIENT)
Dept: GASTROENTEROLOGY | Facility: CLINIC | Age: 60
End: 2020-12-23
Attending: INTERNAL MEDICINE

## 2021-02-09 ENCOUNTER — TELEPHONE (OUTPATIENT)
Dept: SURGERY | Facility: CLINIC | Age: 61
End: 2021-02-09

## 2021-02-09 NOTE — TELEPHONE ENCOUNTER
Pt canceled appt for today with Chyna Martin NP she rescheduled to 3-8-21.    Pt said due to her work.  Jaqui

## 2021-04-02 ENCOUNTER — HOSPITAL ENCOUNTER (OUTPATIENT)
Dept: GASTROENTEROLOGY | Facility: HOSPITAL | Age: 61
Setting detail: HOSPITAL OUTPATIENT SURGERY
Discharge: HOME | End: 2021-04-02
Attending: INTERNAL MEDICINE

## 2021-04-14 ENCOUNTER — HOSPITAL ENCOUNTER (OUTPATIENT)
Dept: CARDIOLOGY | Facility: HOSPITAL | Age: 61
Discharge: HOME | End: 2021-04-14
Attending: GENERAL PRACTICE

## 2021-04-26 ENCOUNTER — HOSPITAL ENCOUNTER (OUTPATIENT)
Dept: CARDIOLOGY | Facility: HOSPITAL | Age: 61
Discharge: HOME | End: 2021-04-26
Attending: GENERAL PRACTICE

## 2021-05-10 NOTE — H&P
History and Physical      Patient Name: Mabel Granger   Patient ID: [id number]   Sex: Female   YOB: 1960    Primary Care Provider: Nayana Cadena MD   Referring Provider: Nayana Cadena MD    Visit Date: December 23, 2020    Provider: Spencer Sapp MD   Location: [facility]   Location Address: [address] [place] [address]  [id number]   Location Phone: [phone]          Chief Complaint  · Surgical History and Physical  · Screening Colonoscopy      History Of Present Illness  NON-INPATIENT HISTORY AND PHYSICAL  Allergies: NO KNOWN DRUG ALLERGIES   Chief Complaint/History of Present Illness: Colon screening, Family history of colon cancer   Colon Recall: No   Failed Outpatient Treatment/Contraindications: N/A   Current Medications: losartan 25 mg oral tablet and NuLYTELY with Flavor Packs 420 gram oral recon soln   Significant Past Medical History: PAST MEDICAL HISTORY   Significant Family Medical History: Family history of colon cancer: Mother 55 years old   Significant Past Surgical History: Bunionectomy, Hysterectomy, Lumpectomy, and Tubal ligation   Previous Colonoscopy: No   Previous EGD: No   PHYSICAL EXAM:  Heart: Regular Rate and Rhythm   Lungs: Breathing Unlabored           Assessment  · Preoperative examination     V72.84/Z01.818  · Screening for colon cancer     V76.51/Z12.11      Plan  · Orders  o Consent for Colonoscopy Screening -Possible risk/complications, benefits, and alternatives to surgical or invasive procedure have been explained to patient and/or legal gaurdian. -Patient has been evaluated and can tolerate anethesia and/or sedation. Risk, benefits, and alternatives to anesthesia and sedation have been explained to patient or legal gaurdian. () - V72.84/Z01.818, V76.51/Z12.11 - 02/09/2021  · Medications  o Medications have been Reconciled  o Transition of Care or Provider Policy  · Instructions  o ****Surgical  Orders****  o ***************  o Outpatient  o ***************  o RISK AND BENEFITS:  o Possible risks/complications, benefits and alternatives to surgical or invasive procedure have been explained to the patient and/or legal guardian.  o Patient has been evaluated and can tolerate anesthesia and/or sedation. Risks, benefits, and alternatives to anesthesia and sedation have been explained to the patient and/or legal guardian.  o ***************  o PREP: Per protocol  o IV: Per Anesthesia  o The above History and Physical Examination has been completed within 30 days of admission.  o This note has been transcribed by EDUIN Tiwari. I have read and agree with the findings in this note.  o Electronically Identified Patient Education Materials Provided Electronically  · Disposition  o Call or Return if symptoms worsen or persist.            Electronically Signed by: Cinthia Jeffries MA -Author on December 23, 2020 10:08:35 AM

## 2021-05-25 ENCOUNTER — TRANSCRIBE ORDERS (OUTPATIENT)
Dept: ADMINISTRATIVE | Facility: HOSPITAL | Age: 61
End: 2021-05-25

## 2021-05-25 DIAGNOSIS — M79.602 PAIN IN LEFT ARM: ICD-10-CM

## 2021-06-04 ENCOUNTER — HOSPITAL ENCOUNTER (OUTPATIENT)
Dept: GENERAL RADIOLOGY | Facility: HOSPITAL | Age: 61
Discharge: HOME | End: 2021-06-04
Attending: GENERAL PRACTICE

## 2021-06-04 LAB
25(OH)D3 SERPL-MCNC: 29.6 NG/ML (ref 30–100)
ALBUMIN SERPL-MCNC: 3.8 G/DL (ref 3.5–5)
ALBUMIN/GLOB SERPL: 1.2 {RATIO} (ref 1.4–2.6)
ALP SERPL-CCNC: 101 U/L (ref 53–141)
ALT SERPL-CCNC: 17 U/L (ref 10–40)
ANION GAP SERPL CALC-SCNC: 13 MMOL/L (ref 8–19)
AST SERPL-CCNC: 19 U/L (ref 15–50)
BASOPHILS # BLD AUTO: 0.03 10*3/UL (ref 0–0.2)
BASOPHILS NFR BLD AUTO: 0.6 % (ref 0–3)
BILIRUB SERPL-MCNC: 0.47 MG/DL (ref 0.2–1.3)
BUN SERPL-MCNC: 14 MG/DL (ref 5–25)
BUN/CREAT SERPL: 18 {RATIO} (ref 6–20)
CALCIUM SERPL-MCNC: 9 MG/DL (ref 8.7–10.4)
CHLORIDE SERPL-SCNC: 104 MMOL/L (ref 99–111)
CHOLEST SERPL-MCNC: 210 MG/DL (ref 107–200)
CHOLEST/HDLC SERPL: 3.7 {RATIO} (ref 3–6)
CK SERPL-CCNC: 53 U/L (ref 35–230)
CONV ABS IMM GRAN: 0.02 10*3/UL (ref 0–0.2)
CONV CO2: 28 MMOL/L (ref 22–32)
CONV IMMATURE GRAN: 0.4 % (ref 0–1.8)
CONV TOTAL PROTEIN: 6.9 G/DL (ref 6.3–8.2)
CREAT UR-MCNC: 0.77 MG/DL (ref 0.5–0.9)
DEPRECATED RDW RBC AUTO: 48.2 FL (ref 36.4–46.3)
EOSINOPHIL # BLD AUTO: 0.14 10*3/UL (ref 0–0.7)
EOSINOPHIL # BLD AUTO: 2.8 % (ref 0–7)
ERYTHROCYTE [DISTWIDTH] IN BLOOD BY AUTOMATED COUNT: 13.7 % (ref 11.7–14.4)
EST. AVERAGE GLUCOSE BLD GHB EST-MCNC: 117 MG/DL
GFR SERPLBLD BASED ON 1.73 SQ M-ARVRAT: >60 ML/MIN/{1.73_M2}
GLOBULIN UR ELPH-MCNC: 3.1 G/DL (ref 2–3.5)
GLUCOSE SERPL-MCNC: 83 MG/DL (ref 65–99)
HBA1C MFR BLD: 5.7 % (ref 3.5–5.7)
HCT VFR BLD AUTO: 41.8 % (ref 37–47)
HDLC SERPL-MCNC: 57 MG/DL (ref 40–60)
HGB BLD-MCNC: 13.5 G/DL (ref 12–16)
LDLC SERPL CALC-MCNC: 134 MG/DL (ref 70–100)
LYMPHOCYTES # BLD AUTO: 1.53 10*3/UL (ref 1–5)
LYMPHOCYTES NFR BLD AUTO: 30.1 % (ref 20–45)
MCH RBC QN AUTO: 30.8 PG (ref 27–31)
MCHC RBC AUTO-ENTMCNC: 32.3 G/DL (ref 33–37)
MCV RBC AUTO: 95.4 FL (ref 81–99)
MONOCYTES # BLD AUTO: 0.37 10*3/UL (ref 0.2–1.2)
MONOCYTES NFR BLD AUTO: 7.3 % (ref 3–10)
NEUTROPHILS # BLD AUTO: 3 10*3/UL (ref 2–8)
NEUTROPHILS NFR BLD AUTO: 58.8 % (ref 30–85)
NRBC CBCN: 0 % (ref 0–0.7)
OSMOLALITY SERPL CALC.SUM OF ELEC: 290 MOSM/KG (ref 273–304)
PLATELET # BLD AUTO: 259 10*3/UL (ref 130–400)
PMV BLD AUTO: 10.3 FL (ref 9.4–12.3)
POTASSIUM SERPL-SCNC: 4.9 MMOL/L (ref 3.5–5.3)
RBC # BLD AUTO: 4.38 10*6/UL (ref 4.2–5.4)
SODIUM SERPL-SCNC: 140 MMOL/L (ref 135–147)
TRIGL SERPL-MCNC: 94 MG/DL (ref 40–150)
TSH SERPL-ACNC: 1.36 M[IU]/L (ref 0.27–4.2)
VIT B12 SERPL-MCNC: 538 PG/ML (ref 211–911)
VLDLC SERPL-MCNC: 19 MG/DL (ref 5–37)
WBC # BLD AUTO: 5.09 10*3/UL (ref 4.8–10.8)

## 2021-07-07 ENCOUNTER — HOSPITAL ENCOUNTER (OUTPATIENT)
Dept: CARDIOLOGY | Facility: HOSPITAL | Age: 61
Discharge: HOME | End: 2021-07-07
Admitting: GENERAL PRACTICE

## 2021-07-07 DIAGNOSIS — M79.602 PAIN IN LEFT ARM: ICD-10-CM

## 2021-07-07 LAB
BH CV UPPER VENOUS LEFT AXILLARY AUGMENT: NORMAL
BH CV UPPER VENOUS LEFT AXILLARY COMPETENT: NORMAL
BH CV UPPER VENOUS LEFT AXILLARY COMPRESS: NORMAL
BH CV UPPER VENOUS LEFT AXILLARY PHASIC: NORMAL
BH CV UPPER VENOUS LEFT AXILLARY SPONT: NORMAL
BH CV UPPER VENOUS LEFT BASILIC FOREARM COMPRESS: NORMAL
BH CV UPPER VENOUS LEFT BASILIC UPPER COMPRESS: NORMAL
BH CV UPPER VENOUS LEFT BRACHIAL COMPRESS: NORMAL
BH CV UPPER VENOUS LEFT CEPHALIC UPPER COMPRESS: NORMAL
BH CV UPPER VENOUS LEFT INTERNAL JUGULAR AUGMENT: NORMAL
BH CV UPPER VENOUS LEFT INTERNAL JUGULAR COMPETENT: NORMAL
BH CV UPPER VENOUS LEFT INTERNAL JUGULAR COMPRESS: NORMAL
BH CV UPPER VENOUS LEFT INTERNAL JUGULAR PHASIC: NORMAL
BH CV UPPER VENOUS LEFT INTERNAL JUGULAR SPONT: NORMAL
BH CV UPPER VENOUS LEFT RADIAL COMPRESS: NORMAL
BH CV UPPER VENOUS LEFT SUBCLAVIAN AUGMENT: NORMAL
BH CV UPPER VENOUS LEFT SUBCLAVIAN COMPETENT: NORMAL
BH CV UPPER VENOUS LEFT SUBCLAVIAN COMPRESS: NORMAL
BH CV UPPER VENOUS LEFT SUBCLAVIAN PHASIC: NORMAL
BH CV UPPER VENOUS LEFT SUBCLAVIAN SPONT: NORMAL
BH CV UPPER VENOUS LEFT ULNAR COMPRESS: NORMAL
BH CV UPPER VENOUS RIGHT INTERNAL JUGULAR AUGMENT: NORMAL
BH CV UPPER VENOUS RIGHT INTERNAL JUGULAR COMPETENT: NORMAL
BH CV UPPER VENOUS RIGHT INTERNAL JUGULAR COMPRESS: NORMAL
BH CV UPPER VENOUS RIGHT INTERNAL JUGULAR PHASIC: NORMAL
BH CV UPPER VENOUS RIGHT INTERNAL JUGULAR SPONT: NORMAL
BH CV UPPER VENOUS RIGHT SUBCLAVIAN AUGMENT: NORMAL
BH CV UPPER VENOUS RIGHT SUBCLAVIAN COMPETENT: NORMAL
BH CV UPPER VENOUS RIGHT SUBCLAVIAN COMPRESS: NORMAL
BH CV UPPER VENOUS RIGHT SUBCLAVIAN PHASIC: NORMAL
BH CV UPPER VENOUS RIGHT SUBCLAVIAN SPONT: NORMAL
MAXIMAL PREDICTED HEART RATE: 161 BPM
STRESS TARGET HR: 137 BPM

## 2021-07-07 PROCEDURE — 93971 EXTREMITY STUDY: CPT

## 2021-07-07 PROCEDURE — 93971 EXTREMITY STUDY: CPT | Performed by: SURGERY

## 2021-10-28 ENCOUNTER — TELEPHONE (OUTPATIENT)
Dept: SURGERY | Facility: CLINIC | Age: 61
End: 2021-10-28

## 2021-10-28 ENCOUNTER — TRANSCRIBE ORDERS (OUTPATIENT)
Dept: ADMINISTRATIVE | Facility: HOSPITAL | Age: 61
End: 2021-10-28

## 2021-10-28 DIAGNOSIS — Z12.31 VISIT FOR SCREENING MAMMOGRAM: Primary | ICD-10-CM

## 2021-10-28 NOTE — TELEPHONE ENCOUNTER
Caller: Mabel Granger    Relationship to patient: Self    Best call back number: [phone]    Patient is needing: TO SPEAK WITH NURSE REGARDING GETTING A MRI SCHEDULED WITH HER MAMMO.        What is the best time to reach you: ANYTIME    Who are you requesting to speak with (clinical staff, provider,  specific staff member): CLINICAL    What was the call regarding: PT REQUEST TO HAVE MRI WITH MAMMO DONE.     Do you require a callback: YES

## 2021-10-29 ENCOUNTER — TELEPHONE (OUTPATIENT)
Dept: SURGERY | Facility: CLINIC | Age: 61
End: 2021-10-29

## 2021-10-29 NOTE — TELEPHONE ENCOUNTER
Patient saw Dr. Quach last 12/2019 as follow up to ADH     She was to have MMG and MRI   This was done last 9/2020 and was benign     She was to see you Chyna 2/2021 but canceled due to work and has not rescheduled.   Now she is due her Mammogram and MRI and would like to know if we would order this for her.   Would you like to see her in office first?

## 2021-11-01 ENCOUNTER — TELEPHONE (OUTPATIENT)
Dept: SURGERY | Facility: CLINIC | Age: 61
End: 2021-11-01

## 2021-11-01 DIAGNOSIS — N60.99 ATYPICAL DUCTAL HYPERPLASIA OF BREAST: Primary | ICD-10-CM

## 2021-11-01 NOTE — TELEPHONE ENCOUNTER
Patient saw Dr. Quach last 12/2019 as follow up to ADH    She was to have MMG and MRI  This was done last 9/2020 and was benign    She was to see Chyna 2/2021 but canceled due to work and has not rescheduled.   Now she is due her Mammogram and MRI and would like to know if we would order this for her.       It would be ideal to see patient in office first  Given the current scheduling situation we do not have availability for quite awhile.    We can order imaging with clinic afterwards,      [facility] 11/30/21  Mammo 11:30  Breast MRI 1:00  Follow up with Chyna Martin Tuesday March 29th 1:00.     We let her know we cannot keep this up if she cancels clinic appointments.  She can be added to cancel list to move up if possible.

## 2021-11-22 ENCOUNTER — LAB (OUTPATIENT)
Dept: LAB | Facility: HOSPITAL | Age: 61
End: 2021-11-22

## 2021-11-22 ENCOUNTER — TRANSCRIBE ORDERS (OUTPATIENT)
Dept: LAB | Facility: HOSPITAL | Age: 61
End: 2021-11-22

## 2021-11-22 DIAGNOSIS — E53.9 VITAMIN B-COMPLEX DEFICIENCY: ICD-10-CM

## 2021-11-22 DIAGNOSIS — E55.9 AVITAMINOSIS D: ICD-10-CM

## 2021-11-22 DIAGNOSIS — E78.5 HYPERLIPIDEMIA, UNSPECIFIED HYPERLIPIDEMIA TYPE: ICD-10-CM

## 2021-11-22 DIAGNOSIS — R73.01 IMPAIRED FASTING GLUCOSE: ICD-10-CM

## 2021-11-22 DIAGNOSIS — I10 ESSENTIAL HYPERTENSION, MALIGNANT: Primary | ICD-10-CM

## 2021-11-22 DIAGNOSIS — I10 ESSENTIAL HYPERTENSION, MALIGNANT: ICD-10-CM

## 2021-11-22 LAB
25(OH)D3 SERPL-MCNC: 35.2 NG/ML
ALBUMIN SERPL-MCNC: 4 G/DL (ref 3.5–5.2)
ALBUMIN/GLOB SERPL: 1.5 G/DL
ALP SERPL-CCNC: 100 U/L (ref 39–117)
ALT SERPL W P-5'-P-CCNC: 16 U/L (ref 1–33)
ANION GAP SERPL CALCULATED.3IONS-SCNC: 11.7 MMOL/L (ref 5–15)
AST SERPL-CCNC: 19 U/L (ref 1–32)
BASOPHILS # BLD AUTO: 0.04 10*3/MM3 (ref 0–0.2)
BASOPHILS NFR BLD AUTO: 0.6 % (ref 0–1.5)
BILIRUB SERPL-MCNC: 0.4 MG/DL (ref 0–1.2)
BUN SERPL-MCNC: 11 MG/DL (ref 8–23)
BUN/CREAT SERPL: 13.9 (ref 7–25)
CALCIUM SPEC-SCNC: 9.5 MG/DL (ref 8.6–10.5)
CHLORIDE SERPL-SCNC: 105 MMOL/L (ref 98–107)
CHOLEST SERPL-MCNC: 184 MG/DL (ref 0–200)
CK SERPL-CCNC: 62 U/L (ref 20–180)
CO2 SERPL-SCNC: 26.3 MMOL/L (ref 22–29)
CREAT SERPL-MCNC: 0.79 MG/DL (ref 0.57–1)
DEPRECATED RDW RBC AUTO: 44.1 FL (ref 37–54)
EOSINOPHIL # BLD AUTO: 0.11 10*3/MM3 (ref 0–0.4)
EOSINOPHIL NFR BLD AUTO: 1.7 % (ref 0.3–6.2)
ERYTHROCYTE [DISTWIDTH] IN BLOOD BY AUTOMATED COUNT: 13.1 % (ref 12.3–15.4)
GFR SERPL CREATININE-BSD FRML MDRD: 74 ML/MIN/1.73
GLOBULIN UR ELPH-MCNC: 2.7 GM/DL
GLUCOSE SERPL-MCNC: 91 MG/DL (ref 65–99)
HBA1C MFR BLD: 5.5 % (ref 4.8–5.6)
HCT VFR BLD AUTO: 40.6 % (ref 34–46.6)
HDLC SERPL-MCNC: 54 MG/DL (ref 40–60)
HGB BLD-MCNC: 13.7 G/DL (ref 12–15.9)
IMM GRANULOCYTES # BLD AUTO: 0.02 10*3/MM3 (ref 0–0.05)
IMM GRANULOCYTES NFR BLD AUTO: 0.3 % (ref 0–0.5)
LDLC SERPL CALC-MCNC: 114 MG/DL (ref 0–100)
LDLC/HDLC SERPL: 2.07 {RATIO}
LYMPHOCYTES # BLD AUTO: 1.86 10*3/MM3 (ref 0.7–3.1)
LYMPHOCYTES NFR BLD AUTO: 28.4 % (ref 19.6–45.3)
MCH RBC QN AUTO: 31.1 PG (ref 26.6–33)
MCHC RBC AUTO-ENTMCNC: 33.7 G/DL (ref 31.5–35.7)
MCV RBC AUTO: 92.1 FL (ref 79–97)
MONOCYTES # BLD AUTO: 0.45 10*3/MM3 (ref 0.1–0.9)
MONOCYTES NFR BLD AUTO: 6.9 % (ref 5–12)
NEUTROPHILS NFR BLD AUTO: 4.06 10*3/MM3 (ref 1.7–7)
NEUTROPHILS NFR BLD AUTO: 62.1 % (ref 42.7–76)
NRBC BLD AUTO-RTO: 0 /100 WBC (ref 0–0.2)
PLATELET # BLD AUTO: 295 10*3/MM3 (ref 140–450)
PMV BLD AUTO: 10.4 FL (ref 6–12)
POTASSIUM SERPL-SCNC: 5.2 MMOL/L (ref 3.5–5.2)
PROT SERPL-MCNC: 6.7 G/DL (ref 6–8.5)
RBC # BLD AUTO: 4.41 10*6/MM3 (ref 3.77–5.28)
SODIUM SERPL-SCNC: 143 MMOL/L (ref 136–145)
TRIGL SERPL-MCNC: 90 MG/DL (ref 0–150)
TSH SERPL DL<=0.05 MIU/L-ACNC: 1.46 UIU/ML (ref 0.27–4.2)
VIT B12 BLD-MCNC: 553 PG/ML (ref 211–946)
VLDLC SERPL-MCNC: 16 MG/DL (ref 5–40)
WBC NRBC COR # BLD: 6.54 10*3/MM3 (ref 3.4–10.8)

## 2021-11-22 PROCEDURE — 84443 ASSAY THYROID STIM HORMONE: CPT

## 2021-11-22 PROCEDURE — 82550 ASSAY OF CK (CPK): CPT

## 2021-11-22 PROCEDURE — 36415 COLL VENOUS BLD VENIPUNCTURE: CPT

## 2021-11-22 PROCEDURE — 80053 COMPREHEN METABOLIC PANEL: CPT

## 2021-11-22 PROCEDURE — 80061 LIPID PANEL: CPT

## 2021-11-22 PROCEDURE — 83036 HEMOGLOBIN GLYCOSYLATED A1C: CPT

## 2021-11-22 PROCEDURE — 85025 COMPLETE CBC W/AUTO DIFF WBC: CPT

## 2021-11-22 PROCEDURE — 82607 VITAMIN B-12: CPT

## 2021-11-22 PROCEDURE — 82306 VITAMIN D 25 HYDROXY: CPT

## 2021-11-30 ENCOUNTER — HOSPITAL ENCOUNTER (OUTPATIENT)
Dept: MRI IMAGING | Facility: HOSPITAL | Age: 61
Discharge: HOME | End: 2021-11-30

## 2021-11-30 ENCOUNTER — HOSPITAL ENCOUNTER (OUTPATIENT)
Dept: MAMMOGRAPHY | Facility: HOSPITAL | Age: 61
Discharge: HOME | End: 2021-11-30

## 2021-11-30 DIAGNOSIS — Z12.31 VISIT FOR SCREENING MAMMOGRAM: ICD-10-CM

## 2021-11-30 PROCEDURE — A9577 INJ MULTIHANCE: HCPCS | Performed by: NURSE PRACTITIONER

## 2021-11-30 PROCEDURE — 77049 MRI BREAST C-+ W/CAD BI: CPT

## 2021-11-30 PROCEDURE — 77063 BREAST TOMOSYNTHESIS BI: CPT

## 2021-11-30 PROCEDURE — 0 GADOBENATE DIMEGLUMINE 529 MG/ML SOLUTION: Performed by: NURSE PRACTITIONER

## 2021-11-30 PROCEDURE — 77067 SCR MAMMO BI INCL CAD: CPT

## 2021-11-30 RX ADMIN — GADOBENATE DIMEGLUMINE 20 ML: 529 INJECTION, SOLUTION INTRAVENOUS at 13:37

## 2021-12-01 ENCOUNTER — TELEPHONE (OUTPATIENT)
Dept: SURGERY | Facility: CLINIC | Age: 61
End: 2021-12-01

## 2021-12-01 NOTE — TELEPHONE ENCOUNTER
Attempted to call patient. Voicemail is not set up. I added her to the cancellation list and will try to call again.     ----- Message from LISA Navarro sent at 12/1/2021 10:19 AM EST -----  Please add her to cancel list, current appointment in in 3/2022.

## 2022-02-01 ENCOUNTER — TELEPHONE (OUTPATIENT)
Dept: SURGERY | Facility: CLINIC | Age: 62
End: 2022-02-01

## 2022-02-16 ENCOUNTER — TRANSCRIBE ORDERS (OUTPATIENT)
Dept: LAB | Facility: HOSPITAL | Age: 62
End: 2022-02-16

## 2022-02-16 DIAGNOSIS — R73.01 IMPAIRED FASTING GLUCOSE: ICD-10-CM

## 2022-02-16 DIAGNOSIS — E55.9 AVITAMINOSIS D: ICD-10-CM

## 2022-02-16 DIAGNOSIS — I10 ESSENTIAL HYPERTENSION, MALIGNANT: Primary | ICD-10-CM

## 2022-02-16 DIAGNOSIS — E53.9 VITAMIN B-COMPLEX DEFICIENCY: ICD-10-CM

## 2022-02-16 DIAGNOSIS — E78.5 HYPERLIPIDEMIA, UNSPECIFIED HYPERLIPIDEMIA TYPE: ICD-10-CM

## 2022-02-17 ENCOUNTER — LAB (OUTPATIENT)
Dept: LAB | Facility: HOSPITAL | Age: 62
End: 2022-02-17

## 2022-02-17 DIAGNOSIS — I10 ESSENTIAL HYPERTENSION, MALIGNANT: ICD-10-CM

## 2022-02-17 DIAGNOSIS — E78.5 HYPERLIPIDEMIA, UNSPECIFIED HYPERLIPIDEMIA TYPE: ICD-10-CM

## 2022-02-17 DIAGNOSIS — R73.01 IMPAIRED FASTING GLUCOSE: ICD-10-CM

## 2022-02-17 DIAGNOSIS — E55.9 AVITAMINOSIS D: ICD-10-CM

## 2022-02-17 DIAGNOSIS — E53.9 VITAMIN B-COMPLEX DEFICIENCY: ICD-10-CM

## 2022-02-17 LAB
25(OH)D3 SERPL-MCNC: 32.3 NG/ML (ref 30–100)
ALBUMIN SERPL-MCNC: 4.3 G/DL (ref 3.5–5.2)
ALBUMIN/GLOB SERPL: 1.7 G/DL
ALP SERPL-CCNC: 104 U/L (ref 39–117)
ALT SERPL W P-5'-P-CCNC: 16 U/L (ref 1–33)
ANION GAP SERPL CALCULATED.3IONS-SCNC: 13 MMOL/L (ref 5–15)
AST SERPL-CCNC: 23 U/L (ref 1–32)
BASOPHILS # BLD AUTO: 0.04 10*3/MM3 (ref 0–0.2)
BASOPHILS NFR BLD AUTO: 0.6 % (ref 0–1.5)
BILIRUB SERPL-MCNC: 0.5 MG/DL (ref 0–1.2)
BUN SERPL-MCNC: 17 MG/DL (ref 8–23)
BUN/CREAT SERPL: 21.8 (ref 7–25)
CALCIUM SPEC-SCNC: 9.2 MG/DL (ref 8.6–10.5)
CHLORIDE SERPL-SCNC: 102 MMOL/L (ref 98–107)
CHOLEST SERPL-MCNC: 147 MG/DL (ref 0–200)
CK SERPL-CCNC: 60 U/L (ref 20–180)
CO2 SERPL-SCNC: 23 MMOL/L (ref 22–29)
CREAT SERPL-MCNC: 0.78 MG/DL (ref 0.57–1)
DEPRECATED RDW RBC AUTO: 45.6 FL (ref 37–54)
EOSINOPHIL # BLD AUTO: 0.12 10*3/MM3 (ref 0–0.4)
EOSINOPHIL NFR BLD AUTO: 1.7 % (ref 0.3–6.2)
ERYTHROCYTE [DISTWIDTH] IN BLOOD BY AUTOMATED COUNT: 13.3 % (ref 12.3–15.4)
GFR SERPL CREATININE-BSD FRML MDRD: 75 ML/MIN/1.73
GLOBULIN UR ELPH-MCNC: 2.5 GM/DL
GLUCOSE SERPL-MCNC: 88 MG/DL (ref 65–99)
HBA1C MFR BLD: 5.5 % (ref 4.8–5.6)
HCT VFR BLD AUTO: 41.9 % (ref 34–46.6)
HDLC SERPL-MCNC: 56 MG/DL (ref 40–60)
HGB BLD-MCNC: 14.2 G/DL (ref 12–15.9)
IMM GRANULOCYTES # BLD AUTO: 0.02 10*3/MM3 (ref 0–0.05)
IMM GRANULOCYTES NFR BLD AUTO: 0.3 % (ref 0–0.5)
LDLC SERPL CALC-MCNC: 74 MG/DL (ref 0–100)
LDLC/HDLC SERPL: 1.3 {RATIO}
LYMPHOCYTES # BLD AUTO: 1.8 10*3/MM3 (ref 0.7–3.1)
LYMPHOCYTES NFR BLD AUTO: 25.4 % (ref 19.6–45.3)
MCH RBC QN AUTO: 32 PG (ref 26.6–33)
MCHC RBC AUTO-ENTMCNC: 33.9 G/DL (ref 31.5–35.7)
MCV RBC AUTO: 94.4 FL (ref 79–97)
MONOCYTES # BLD AUTO: 0.42 10*3/MM3 (ref 0.1–0.9)
MONOCYTES NFR BLD AUTO: 5.9 % (ref 5–12)
NEUTROPHILS NFR BLD AUTO: 4.7 10*3/MM3 (ref 1.7–7)
NEUTROPHILS NFR BLD AUTO: 66.1 % (ref 42.7–76)
NRBC BLD AUTO-RTO: 0 /100 WBC (ref 0–0.2)
PLATELET # BLD AUTO: 273 10*3/MM3 (ref 140–450)
PMV BLD AUTO: 10.7 FL (ref 6–12)
POTASSIUM SERPL-SCNC: 4.3 MMOL/L (ref 3.5–5.2)
PROT SERPL-MCNC: 6.8 G/DL (ref 6–8.5)
RBC # BLD AUTO: 4.44 10*6/MM3 (ref 3.77–5.28)
SODIUM SERPL-SCNC: 138 MMOL/L (ref 136–145)
TRIGL SERPL-MCNC: 90 MG/DL (ref 0–150)
TSH SERPL DL<=0.05 MIU/L-ACNC: 1.77 UIU/ML (ref 0.27–4.2)
VIT B12 BLD-MCNC: 580 PG/ML (ref 211–946)
VLDLC SERPL-MCNC: 17 MG/DL (ref 5–40)
WBC NRBC COR # BLD: 7.1 10*3/MM3 (ref 3.4–10.8)

## 2022-02-17 PROCEDURE — 36415 COLL VENOUS BLD VENIPUNCTURE: CPT

## 2022-02-17 PROCEDURE — 80061 LIPID PANEL: CPT

## 2022-02-17 PROCEDURE — 82550 ASSAY OF CK (CPK): CPT

## 2022-02-17 PROCEDURE — 80053 COMPREHEN METABOLIC PANEL: CPT

## 2022-02-17 PROCEDURE — 83036 HEMOGLOBIN GLYCOSYLATED A1C: CPT

## 2022-02-17 PROCEDURE — 82306 VITAMIN D 25 HYDROXY: CPT

## 2022-02-17 PROCEDURE — 84443 ASSAY THYROID STIM HORMONE: CPT

## 2022-02-17 PROCEDURE — 82607 VITAMIN B-12: CPT

## 2022-02-17 PROCEDURE — 85025 COMPLETE CBC W/AUTO DIFF WBC: CPT

## 2022-04-18 ENCOUNTER — OFFICE VISIT (OUTPATIENT)
Dept: SLEEP MEDICINE | Facility: HOSPITAL | Age: 62
End: 2022-04-18

## 2022-04-18 VITALS
HEIGHT: 68 IN | HEART RATE: 74 BPM | TEMPERATURE: 96 F | DIASTOLIC BLOOD PRESSURE: 71 MMHG | WEIGHT: 228 LBS | SYSTOLIC BLOOD PRESSURE: 124 MMHG | OXYGEN SATURATION: 97 % | BODY MASS INDEX: 34.56 KG/M2

## 2022-04-18 DIAGNOSIS — G47.30 OBSERVED SLEEP APNEA: Primary | ICD-10-CM

## 2022-04-18 DIAGNOSIS — E66.9 CLASS 1 OBESITY: ICD-10-CM

## 2022-04-18 DIAGNOSIS — G47.10 HYPERSOMNIA: ICD-10-CM

## 2022-04-18 DIAGNOSIS — G47.8 NON-RESTORATIVE SLEEP: ICD-10-CM

## 2022-04-18 DIAGNOSIS — R06.83 SNORING: ICD-10-CM

## 2022-04-18 PROBLEM — E66.811 CLASS 1 OBESITY: Status: ACTIVE | Noted: 2022-04-18

## 2022-04-18 PROCEDURE — G0463 HOSPITAL OUTPT CLINIC VISIT: HCPCS | Performed by: INTERNAL MEDICINE

## 2022-04-18 PROCEDURE — 99244 OFF/OP CNSLTJ NEW/EST MOD 40: CPT | Performed by: INTERNAL MEDICINE

## 2022-04-18 NOTE — PROGRESS NOTES
[facility]  [address] [place] [address]  [place]   [place] [address]  Phone: [phone]  Fax: [phone]      Mabel Granger  [id number]   1960  61 y.o.  female      Referring physician/provider and PCP Nayana Cadena MD    Type of service: Initial Sleep Medicine Consult.  Date of service: 4/18/2022      Chief Complaint   Patient presents with   • Snoring   • Witnessed Apnea   • Fatigue   • Daytime Sleepiness   • Obesity   • Non-restorative Sleep       History of present illness;  Thank you for asking to see Mabel Granger, 61 y.o.. The patient was seen today on 4/18/2022 at [facility].  The patient presents today with symptoms of snoring, non-restorative sleep and witnessed apneas. The symptoms are present for 2 3 years and they are persistent in nature.  The snoring is present in all positions and it is loud.  Has no history of prior sleep evaluation and sleep studies. Patient has no prior surgery namely tonsillectomy, nasal surgery and UPPP.   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  Sleep schedule:  Bedtime: 8:30 PM  Wake time: 5 AM  Normally takes about 20-30 minutes to fall asleep  Average hours of sleep 6 hours  Number of naps per day none  Symptoms   In addition to snoring, nonrestorative sleep and witnessed apneas patient gives the following associated symptoms.  Have you ever awakened gasping for breath, coughing, choking: Yes   Change in weight,  Yes lost 10 pounds  Morning headaches  No   Awaken with a sore throat or dry mouth  Yes   Leg jerking at night:  No   Crawly feeling/urge sensation to move in the legs: No   Teeth grinding:No   Have you ever awakened at night with a sour taste or burning sensation in your chest:  No   Do you have muscle weakness with laughing or anger or sleep paralysis:  No   Have you ever felt paralyzed while going to sleep or waking up:  No  "  Sleepwalking, nightmares, No   Nocturia (urination at night): 2 times per night  Memory Problem:No     MEDICAL CONDITIONS (PMH)  1. Hypertension  2. Hyperlipidemia    Social history:  Do you drive a commercial vehicle:  No   Shift work:  No   Tobacco use:  Yes   Alcohol use: 0 per week  Caffeinated drinks: 2    Family Hx (your parents and siblings)  1. Sleep apnea, brother    Medications: reviewed    Review of systems:  Sleep: Positve for snoring,witnessed apnea and daytime excessive sleepiness with Prospect Sleepiness Scale of Total score: 9   Kidney/ Bladder  Difficulty In Urination: negative  Bed Wetting: negative  Frequent Urination: negative  HEENT  Recurrent Nose Bleeds: negative  Ear pain: negative  Sores In Mouth: negative  Persistent Hoarseness: negative  Nasal Congestion: negative  Post Nasal Drip: negative  Musculoskeletal:  Neck Pain: negative  Temporomandibular Joint Pain: negative  General:  Fever: negative  Fatigue: negative  Cardiovascular:  Irregular Heartbeat: negative  Swollen Ankles Or Legs: negative  Respiratory:  Shortness Of Breath: negative  Wheezing: negative  Neuro/Paych:  Fainting Spells: negative  Dizziness: negative  Anxiety: negative  Depression: negative  Gastrointestinal:  Problem Swallowing: negative  Frequent Heartburn: negative  Abdominal Bloating: negative  Skin:  Rash: negative  Change In Nails: negative  Endocrine:  Excessive Thirst: negative  Always Too Cold: negative  Always Too Warm: negative  Hem/Lymphatic:  Swollen Glands: negative  Burses/ Bleeds Easily: negative    Physical exam:  CONSTITUTINONAL:  Vitals:    04/18/22 0900   BP: 124/71   Pulse: 74   Temp: 96 °F (35.6 °C)   SpO2: 97%   Weight: 103 kg (228 lb)   Height: 172.7 cm (68\")    Body mass index is 34.67 kg/m².   HEAD: atraumatic, normocephalic   EYES:pupils are round, equal and reacting to light and accommodation, conjunctiva normal  NOSE:no nasal septal defects, nasal passages are clear, no nasal polyps, "   THROAT: tonsils are not enlarged, tongue normal size, oral airway Mallampati class 3  NECK: , trachea is in the midline, thyroid not enlarged  RESPIRATORY SYSTEM: Breath sounds are normal, there are no wheezes  CARDIOVASULAR SYSTEM: Heart sounds are regular rhythm and normal rate, there are no murmurs or thrills, no edema  GASTROINTESTINAL: Soft and non-tender,liver not enlarged, no evidence of ascites  MUSCULOSKELETAL SYSTEM: Full range of motion's in all the 4 extremities, neck movement not restricted, temporomandibular joint movement normal and no tenderness  SKIN: Warm and moist, no cyanosis, no clubbing,  NEUROLOGICAL SYSTEM: Oriented x 3, no gross neurological defects, No speech defect, gait is normal  PSYCHIATRIC SYSTEM: Mood is normal, thought content is normal    Labs reviewed.  TSH Results:  TSH    TSH 6/4/21 11/22/21 2/17/22   TSH 1.360 1.460 1.770            Most Recent A1C    HGBA1C Most Recent 2/17/22   Hemoglobin A1C 5.50              Assessment and plan:  · Witnessed apneas,(R06.81) patient's symptoms and examination is consistent with sleep apnea (G47.30). I have talked to the patient about the signs and symptoms of sleep apnea. In addition, I have also discussed pathophysiology of sleep apnea.  I also discussed the complications of untreated sleep apnea including effects on hypertension, diabetes mellitus and nonrestorative sleep with hypersomnia which can increase risk for motor vehicle accidents.  Untreated sleep apnea is also a risk factor for development of atrial fibrillation, pulmonary hypertension and stroke.  Discussed in detail of various testing methods including home-based and lab based sleep studies.  Based on history and physical examination and other comorbidities the most appropriate study is home sleep test.  The order for the sleep study is placed in [facility].  The test will be scheduled after approval from insurance. Treatment and management will be discussed after the test is  completed.  Patient was given opportunity to ask questions and all the questions were answered.   · Snoring (R06.83), snoring is the sound created by turbulent airflow vibrating upper airway soft tissue due to limitation of inspiratory airflow. I have also discussed factors affecting snoring including sleep deprivation, sleeping on the back and alcohol ingestion. To minimize snoring, patient is advised to have adequate sleep, sleep on the side and avoid alcohol and sedative medications before bedtime  · Daytime excessive sleepiness .  It was assessed with Clinton Sleepiness Scale of Total score: 9.  There are many causes for daytime excessive sleepiness including sleep depression, shiftwork syndrome, depression and other medical disorders including heart, kidney and liver failure.  The most serious cause of excessive sleepiness is due to neurological conditions like narcolepsy/cataplexy.  But the most common cause of excessive sleepiness is due to sleep apnea with frequent awakenings during sleep time.  I have discussed safety of driving and to remain vigilant while driving.  · Obesity 1, patient's BMI is Body mass index is 34.67 kg/m².. I have discussed the relationship between weight and sleep apnea.There is direct correlation between weight and severity of sleep apnea.  Weight reduction is encouraged, as it is going to reduce the severity of sleep apnea. I have also discussed with the patient diet and exercise to achieve ideal body weight.  · Hypertension.  Essential hypertension is highly correlated with sleep apnea. Treating sleep apnea will assist in good blood pressure control.    I have also discussed with the patient the following  • Sleep hygiene: Maintaining a regular bedtime and wake time, not to watch television or work in bed, limit caffeine-containing beverages before bed time and avoid naps during the day  • Adequate amount of sleep.  Generally most people needs about 7 to 8 hours of sleep.    Return  for 31 to 90 days after PAP setup with down load..  Patient's questions were answered      I once again thank you for asking me to see this patient in consultation and I have forwarded my opinion and treatment plan.  Please do not hesitate to call me if you have any questions.     Darlin Finley MD  Sleep Medicine  Medical Director, [facility]  4/18/2022 ,

## 2022-05-11 ENCOUNTER — HOSPITAL ENCOUNTER (OUTPATIENT)
Dept: SLEEP MEDICINE | Facility: HOSPITAL | Age: 62
Discharge: HOME | End: 2022-05-11
Admitting: INTERNAL MEDICINE

## 2022-05-11 DIAGNOSIS — E66.9 CLASS 1 OBESITY: ICD-10-CM

## 2022-05-11 DIAGNOSIS — G47.30 OBSERVED SLEEP APNEA: ICD-10-CM

## 2022-05-11 DIAGNOSIS — R06.83 SNORING: ICD-10-CM

## 2022-05-11 DIAGNOSIS — G47.8 NON-RESTORATIVE SLEEP: ICD-10-CM

## 2022-05-11 DIAGNOSIS — G47.10 HYPERSOMNIA: ICD-10-CM

## 2022-05-11 PROCEDURE — 95806 SLEEP STUDY UNATT&RESP EFFT: CPT

## 2022-05-11 PROCEDURE — 95806 SLEEP STUDY UNATT&RESP EFFT: CPT | Performed by: INTERNAL MEDICINE

## 2022-05-24 ENCOUNTER — TELEPHONE (OUTPATIENT)
Dept: SLEEP MEDICINE | Facility: HOSPITAL | Age: 62
End: 2022-05-24

## 2022-06-27 ENCOUNTER — OFFICE VISIT (OUTPATIENT)
Dept: SLEEP MEDICINE | Facility: HOSPITAL | Age: 62
End: 2022-06-27

## 2022-06-27 VITALS
OXYGEN SATURATION: 98 % | DIASTOLIC BLOOD PRESSURE: 81 MMHG | BODY MASS INDEX: 34.56 KG/M2 | SYSTOLIC BLOOD PRESSURE: 132 MMHG | HEART RATE: 66 BPM | HEIGHT: 68 IN | WEIGHT: 228 LBS

## 2022-06-27 DIAGNOSIS — E66.9 CLASS 1 OBESITY: ICD-10-CM

## 2022-06-27 DIAGNOSIS — R06.83 SNORING: ICD-10-CM

## 2022-06-27 DIAGNOSIS — G47.8 UARS (UPPER AIRWAY RESISTANCE SYNDROME): Primary | ICD-10-CM

## 2022-06-27 PROBLEM — G47.30 OBSERVED SLEEP APNEA: Status: RESOLVED | Noted: 2022-04-18 | Resolved: 2022-06-27

## 2022-06-27 PROBLEM — G47.10 HYPERSOMNIA: Status: RESOLVED | Noted: 2022-04-18 | Resolved: 2022-06-27

## 2022-06-27 PROCEDURE — 99212 OFFICE O/P EST SF 10 MIN: CPT | Performed by: INTERNAL MEDICINE

## 2022-06-27 PROCEDURE — G0463 HOSPITAL OUTPT CLINIC VISIT: HCPCS | Performed by: INTERNAL MEDICINE

## 2022-06-27 NOTE — PROGRESS NOTES
"  [place] [address]  [place]   [place] [address]  Phone: [phone]  Fax: [phone]      SLEEP CLINIC FOLLOW UP PROGRESS NOTE.    Mabel Granger  [id number]   1960  61 y.o.  female      PCP: Nayana Cadena MD      Date of visit: 6/27/2022    Chief Complaint   Patient presents with   • Snoring   • Obesity       HPI:  This is a 61 y.o. years old patient who has a history of snoring and other symptoms of sleep apnea was evaluated recently and underwent sleep test.  Patient is here to discuss the test results and treatment options.  I have discussed with the patient the test results.  To summarize the test shows no evidence of sleep apnea.  The AHI(apnea-hypopnea index) is 3.8 /hr, less than 5.0 is normal.  But the test showed snoring for 45% of sleep time with 185 episodes.  No evidence of oxygen desaturation.     Medication and allergies are reviewed by me and documented in the encounter.     SOCIAL ( habits pertaining to sleep medicine)  History of smoking:Yes   History of alcohol use: 0 per week  Caffeine use: 2     REVIEW OF SYSTEMS:   Hope Sleepiness Scale :    Snoring: Yes   Nasal congestion:No       PHYSICAL EXAMINATION:  CONSTITUTIONAL:  Vitals:    06/27/22 1500   BP: 132/81   Pulse: 66   SpO2: 98%   Weight: 103 kg (228 lb)   Height: 172.7 cm (68\")    Body mass index is 34.67 kg/m².  NOSE: nasal passages are clear, no nasal polyps, septum in the midline.  THROAT: throat is clear,       ASSESSMENT AND PLAN:  · Snoring R 06.83 /upper airway resistance syndrome G 47.39  · Patient is advised to lose weight, Body mass index is 34.67 kg/m².  · Advised to sleep on the side, patient can use body pillow to prevent rollover  · Sleep hygiene, with the regular sleep time and wake time  · Advised to avoid alcohol and other sedative medications  · Patient can also use oral appliance.  I have given information about the oral appliance.  · Obesity, class 1 with BMI is Body " mass index is 34.67 kg/m².. I have discuss the relationship between the weight and sleep apnea. The benefit of weight loss in reducing severity of sleep apnea was discussed. Discussed diet and exercise with the patient to achieve ideal BMI.  · Return if symptoms worsen or fail to improve. . Patient's questions were answered.        Darlin Finley MD  Sleep Medicine  Medical Director for [facility]  6/27/2022

## 2022-08-11 ENCOUNTER — TRANSCRIBE ORDERS (OUTPATIENT)
Dept: ADMINISTRATIVE | Facility: HOSPITAL | Age: 62
End: 2022-08-11

## 2022-08-11 DIAGNOSIS — R42 DIZZINESS AND GIDDINESS: Primary | ICD-10-CM

## 2022-08-12 ENCOUNTER — TELEPHONE (OUTPATIENT)
Dept: NEUROLOGY | Facility: OTHER | Age: 62
End: 2022-08-12

## 2022-08-12 NOTE — TELEPHONE ENCOUNTER
GARLAND ([phone]) CALLED FROM REFERRING TO CHECK STATUS OF REFERRAL AND SHE CONFIRMED SHE SENT TO THE CORRECT FAX. TOLD HER IT COULD TAKE UP TO 48 HOURS TO GET UPLOADED TO Epic.

## 2022-09-01 ENCOUNTER — HOSPITAL ENCOUNTER (OUTPATIENT)
Dept: MRI IMAGING | Facility: HOSPITAL | Age: 62
Discharge: HOME | End: 2022-09-01

## 2022-09-01 ENCOUNTER — HOSPITAL ENCOUNTER (OUTPATIENT)
Dept: CARDIOLOGY | Facility: HOSPITAL | Age: 62
Discharge: HOME | End: 2022-09-01

## 2022-09-01 DIAGNOSIS — R42 DIZZINESS AND GIDDINESS: ICD-10-CM

## 2022-09-01 LAB
BH CV XLRA MEAS LEFT CAROTID BULB EDV: 25 CM/SEC
BH CV XLRA MEAS LEFT CAROTID BULB PSV: 81 CM/SEC
BH CV XLRA MEAS LEFT DIST CCA EDV: 27 CM/SEC
BH CV XLRA MEAS LEFT DIST CCA PSV: 82 CM/SEC
BH CV XLRA MEAS LEFT DIST ICA EDV: 39 CM/SEC
BH CV XLRA MEAS LEFT DIST ICA PSV: 88 CM/SEC
BH CV XLRA MEAS LEFT MID ICA EDV: 39 CM/SEC
BH CV XLRA MEAS LEFT MID ICA PSV: 87 CM/SEC
BH CV XLRA MEAS LEFT PROX CCA EDV: 29 CM/SEC
BH CV XLRA MEAS LEFT PROX CCA PSV: 103 CM/SEC
BH CV XLRA MEAS LEFT PROX ECA EDV: 18 CM/SEC
BH CV XLRA MEAS LEFT PROX ECA PSV: 102 CM/SEC
BH CV XLRA MEAS LEFT PROX ICA EDV: 36 CM/SEC
BH CV XLRA MEAS LEFT PROX ICA PSV: 75 CM/SEC
BH CV XLRA MEAS LEFT VERTEBRAL A EDV: 20 CM/SEC
BH CV XLRA MEAS LEFT VERTEBRAL A PSV: 57 CM/SEC
BH CV XLRA MEAS RIGHT CAROTID BULB EDV: 12 CM/SEC
BH CV XLRA MEAS RIGHT CAROTID BULB PSV: 47 CM/SEC
BH CV XLRA MEAS RIGHT DIST CCA EDV: 27 CM/SEC
BH CV XLRA MEAS RIGHT DIST CCA PSV: 85 CM/SEC
BH CV XLRA MEAS RIGHT DIST ICA EDV: 35 CM/SEC
BH CV XLRA MEAS RIGHT DIST ICA PSV: 89 CM/SEC
BH CV XLRA MEAS RIGHT MID ICA EDV: 39 CM/SEC
BH CV XLRA MEAS RIGHT MID ICA PSV: 83 CM/SEC
BH CV XLRA MEAS RIGHT PROX CCA EDV: 24 CM/SEC
BH CV XLRA MEAS RIGHT PROX CCA PSV: 94 CM/SEC
BH CV XLRA MEAS RIGHT PROX ECA EDV: 14 CM/SEC
BH CV XLRA MEAS RIGHT PROX ECA PSV: 90 CM/SEC
BH CV XLRA MEAS RIGHT PROX ICA EDV: 39 CM/SEC
BH CV XLRA MEAS RIGHT PROX ICA PSV: 88 CM/SEC
BH CV XLRA MEAS RIGHT VERTEBRAL A EDV: 12 CM/SEC
BH CV XLRA MEAS RIGHT VERTEBRAL A PSV: 53 CM/SEC
CREAT BLDA-MCNC: 0.7 MG/DL
EGFRCR SERPLBLD CKD-EPI 2021: 98.5 ML/MIN/1.73
LEFT ARM BP: NORMAL MMHG
MAXIMAL PREDICTED HEART RATE: 159 BPM
RIGHT ARM BP: NORMAL MMHG
STRESS TARGET HR: 135 BPM

## 2022-09-01 PROCEDURE — 93880 EXTRACRANIAL BILAT STUDY: CPT

## 2022-09-01 PROCEDURE — 82565 ASSAY OF CREATININE: CPT

## 2022-09-01 PROCEDURE — A9577 INJ MULTIHANCE: HCPCS | Performed by: GENERAL PRACTICE

## 2022-09-01 PROCEDURE — 70553 MRI BRAIN STEM W/O & W/DYE: CPT

## 2022-09-01 PROCEDURE — 93880 EXTRACRANIAL BILAT STUDY: CPT | Performed by: SURGERY

## 2022-09-01 PROCEDURE — 0 GADOBENATE DIMEGLUMINE 529 MG/ML SOLUTION: Performed by: GENERAL PRACTICE

## 2022-09-01 RX ADMIN — GADOBENATE DIMEGLUMINE 20 ML: 529 INJECTION, SOLUTION INTRAVENOUS at 14:36

## 2022-09-14 ENCOUNTER — APPOINTMENT (OUTPATIENT)
Dept: GENERAL RADIOLOGY | Facility: HOSPITAL | Age: 62
End: 2022-09-14

## 2022-09-14 ENCOUNTER — HOSPITAL ENCOUNTER (EMERGENCY)
Facility: HOSPITAL | Age: 62
Discharge: HOME | End: 2022-09-14
Attending: EMERGENCY MEDICINE | Admitting: EMERGENCY MEDICINE

## 2022-09-14 VITALS
HEIGHT: 68 IN | OXYGEN SATURATION: 99 % | TEMPERATURE: 98.4 F | DIASTOLIC BLOOD PRESSURE: 62 MMHG | BODY MASS INDEX: 35.72 KG/M2 | HEART RATE: 55 BPM | RESPIRATION RATE: 18 BRPM | SYSTOLIC BLOOD PRESSURE: 113 MMHG | WEIGHT: 235.67 LBS

## 2022-09-14 DIAGNOSIS — R53.1 GENERALIZED WEAKNESS: Primary | ICD-10-CM

## 2022-09-14 LAB
ALBUMIN SERPL-MCNC: 4.1 G/DL (ref 3.5–5.2)
ALBUMIN/GLOB SERPL: 1.2 G/DL
ALP SERPL-CCNC: 108 U/L (ref 39–117)
ALT SERPL W P-5'-P-CCNC: 14 U/L (ref 1–33)
ANION GAP SERPL CALCULATED.3IONS-SCNC: 10.6 MMOL/L (ref 5–15)
AST SERPL-CCNC: 14 U/L (ref 1–32)
BASOPHILS # BLD AUTO: 0.02 10*3/MM3 (ref 0–0.2)
BASOPHILS NFR BLD AUTO: 0.2 % (ref 0–1.5)
BILIRUB SERPL-MCNC: 0.6 MG/DL (ref 0–1.2)
BILIRUB UR QL STRIP: NEGATIVE
BUN SERPL-MCNC: 13 MG/DL (ref 8–23)
BUN/CREAT SERPL: 18.1 (ref 7–25)
CALCIUM SPEC-SCNC: 9 MG/DL (ref 8.6–10.5)
CHLORIDE SERPL-SCNC: 101 MMOL/L (ref 98–107)
CLARITY UR: CLEAR
CO2 SERPL-SCNC: 27.4 MMOL/L (ref 22–29)
COLOR UR: YELLOW
CREAT SERPL-MCNC: 0.72 MG/DL (ref 0.57–1)
DEPRECATED RDW RBC AUTO: 42.2 FL (ref 37–54)
EGFRCR SERPLBLD CKD-EPI 2021: 95.3 ML/MIN/1.73
EOSINOPHIL # BLD AUTO: 0.14 10*3/MM3 (ref 0–0.4)
EOSINOPHIL NFR BLD AUTO: 1.5 % (ref 0.3–6.2)
ERYTHROCYTE [DISTWIDTH] IN BLOOD BY AUTOMATED COUNT: 12.6 % (ref 12.3–15.4)
FLUAV AG NPH QL: NEGATIVE
FLUBV AG NPH QL IA: NEGATIVE
GLOBULIN UR ELPH-MCNC: 3.4 GM/DL
GLUCOSE SERPL-MCNC: 99 MG/DL (ref 65–99)
GLUCOSE UR STRIP-MCNC: NEGATIVE MG/DL
HCT VFR BLD AUTO: 39.1 % (ref 34–46.6)
HGB BLD-MCNC: 13.4 G/DL (ref 12–15.9)
HGB UR QL STRIP.AUTO: NEGATIVE
HOLD SPECIMEN: NORMAL
HOLD SPECIMEN: NORMAL
IMM GRANULOCYTES # BLD AUTO: 0.09 10*3/MM3 (ref 0–0.05)
IMM GRANULOCYTES NFR BLD AUTO: 0.9 % (ref 0–0.5)
KETONES UR QL STRIP: NEGATIVE
LEUKOCYTE ESTERASE UR QL STRIP.AUTO: NEGATIVE
LYMPHOCYTES # BLD AUTO: 1.78 10*3/MM3 (ref 0.7–3.1)
LYMPHOCYTES NFR BLD AUTO: 18.7 % (ref 19.6–45.3)
MAGNESIUM SERPL-MCNC: 2.2 MG/DL (ref 1.6–2.4)
MCH RBC QN AUTO: 31.3 PG (ref 26.6–33)
MCHC RBC AUTO-ENTMCNC: 34.3 G/DL (ref 31.5–35.7)
MCV RBC AUTO: 91.4 FL (ref 79–97)
MONOCYTES # BLD AUTO: 0.65 10*3/MM3 (ref 0.1–0.9)
MONOCYTES NFR BLD AUTO: 6.8 % (ref 5–12)
NEUTROPHILS NFR BLD AUTO: 6.85 10*3/MM3 (ref 1.7–7)
NEUTROPHILS NFR BLD AUTO: 71.9 % (ref 42.7–76)
NITRITE UR QL STRIP: NEGATIVE
NRBC BLD AUTO-RTO: 0 /100 WBC (ref 0–0.2)
PH UR STRIP.AUTO: 5.5 [PH] (ref 5–8)
PLATELET # BLD AUTO: 345 10*3/MM3 (ref 140–450)
PMV BLD AUTO: 9.6 FL (ref 6–12)
POTASSIUM SERPL-SCNC: 3.6 MMOL/L (ref 3.5–5.2)
PROT SERPL-MCNC: 7.5 G/DL (ref 6–8.5)
PROT UR QL STRIP: NEGATIVE
QT INTERVAL: 403 MS
RBC # BLD AUTO: 4.28 10*6/MM3 (ref 3.77–5.28)
SARS-COV-2 RNA PNL SPEC NAA+PROBE: DETECTED
SODIUM SERPL-SCNC: 139 MMOL/L (ref 136–145)
SP GR UR STRIP: 1.02 (ref 1–1.03)
TROPONIN T SERPL-MCNC: <0.01 NG/ML (ref 0–0.03)
TSH SERPL DL<=0.05 MIU/L-ACNC: 1.12 UIU/ML (ref 0.27–4.2)
UROBILINOGEN UR QL STRIP: NORMAL
WBC NRBC COR # BLD: 9.53 10*3/MM3 (ref 3.4–10.8)
WHOLE BLOOD HOLD COAG: NORMAL
WHOLE BLOOD HOLD SPECIMEN: NORMAL

## 2022-09-14 PROCEDURE — 93005 ELECTROCARDIOGRAM TRACING: CPT | Performed by: EMERGENCY MEDICINE

## 2022-09-14 PROCEDURE — 71045 X-RAY EXAM CHEST 1 VIEW: CPT

## 2022-09-14 PROCEDURE — C9803 HOPD COVID-19 SPEC COLLECT: HCPCS | Performed by: EMERGENCY MEDICINE

## 2022-09-14 PROCEDURE — 87804 INFLUENZA ASSAY W/OPTIC: CPT | Performed by: EMERGENCY MEDICINE

## 2022-09-14 PROCEDURE — 81003 URINALYSIS AUTO W/O SCOPE: CPT | Performed by: EMERGENCY MEDICINE

## 2022-09-14 PROCEDURE — 80053 COMPREHEN METABOLIC PANEL: CPT | Performed by: EMERGENCY MEDICINE

## 2022-09-14 PROCEDURE — 85025 COMPLETE CBC W/AUTO DIFF WBC: CPT

## 2022-09-14 PROCEDURE — 93010 ELECTROCARDIOGRAM REPORT: CPT | Performed by: INTERNAL MEDICINE

## 2022-09-14 PROCEDURE — 93005 ELECTROCARDIOGRAM TRACING: CPT

## 2022-09-14 PROCEDURE — 84443 ASSAY THYROID STIM HORMONE: CPT | Performed by: EMERGENCY MEDICINE

## 2022-09-14 PROCEDURE — U0004 COV-19 TEST NON-CDC HGH THRU: HCPCS | Performed by: EMERGENCY MEDICINE

## 2022-09-14 PROCEDURE — 83735 ASSAY OF MAGNESIUM: CPT | Performed by: EMERGENCY MEDICINE

## 2022-09-14 PROCEDURE — 99284 EMERGENCY DEPT VISIT MOD MDM: CPT

## 2022-09-14 PROCEDURE — 84484 ASSAY OF TROPONIN QUANT: CPT | Performed by: EMERGENCY MEDICINE

## 2022-09-14 RX ORDER — SODIUM CHLORIDE 0.9 % (FLUSH) 0.9 %
10 SYRINGE (ML) INJECTION AS NEEDED
Status: DISCONTINUED | OUTPATIENT
Start: 2022-09-14 | End: 2022-09-14 | Stop reason: HOSPADM

## 2022-09-14 RX ADMIN — SODIUM CHLORIDE 1000 ML: 9 INJECTION, SOLUTION INTRAVENOUS at 13:54

## 2022-09-14 NOTE — ED PROVIDER NOTES
Time: 1:24 PM EDT  Arrived by: private car  Chief Complaint: Hypotension, generalized weakness  History provided by: patient  History is limited by: N/A     History of Present Illness:  Patient is a 62 y.o. year old female who presents to the emergency department with hypotension and increased generalized weakness. Pt states that her blood pressure at home was 97/64. It is currently 111/65 during examination. Pt states she was recently diagnosed with vertigo. Pt states that she has been having increased weakness and feels like she needed to pass out this morning in the shower. Pt says she is trying to stay hydrated.       History provided by:  Patient   used: No        Similar Symptoms Previously: no  Recently seen: no      Patient Care Team  Primary Care Provider: Nayana Cadena MD    Past Medical History:     No Known Allergies  Past Medical History:   Diagnosis Date   • Breast atypical hyperplasia     LEFT   • PONV (postoperative nausea and vomiting)      Past Surgical History:   Procedure Laterality Date   • BREAST BIOPSY     • BREAST LUMPECTOMY Left 11/21/2019    Procedure: left breast needle localized lumpectomy for atypical duct hyperplasia.;  Surgeon: Nya Quach MD;  [facility] [facility] [place] [facility]  Service: General   • BUNIONECTOMY     • HYSTERECTOMY     • MAMMO STEREOTACTIC BREAST BIOPSY 1ST W WO DEVICE     • TUBAL ABDOMINAL LIGATION       Family History   Problem Relation Age of Onset   • Colon cancer Mother 55   • Pancreatic cancer Maternal Grandmother 80   • Throat cancer Maternal Grandfather 75   • Malig Hyperthermia Neg Hx        Home Medications:  Prior to Admission medications    Medication Sig Start Date End Date Taking? Authorizing Provider   acetaminophen (TYLENOL) 500 MG tablet Take 1,000 mg by mouth Every 6 (Six) Hours As Needed for Mild Pain .    Provider, MD Galileo   HYDROcodone-acetaminophen (NORCO) 5-325 MG per tablet Take 1-2 tablets by mouth Every 4 to 6  "Hours As Needed for pain. 11/21/19   Nya Quach MD   losartan (COZAAR) 25 MG tablet Take 25 mg by mouth Daily. 7/29/20   Galileo Garcia MD   ondansetron (ZOFRAN) 4 MG tablet Take 1-2 tablets by mouth Every 8 (Eight) Hours As Needed for nausea. 11/21/19   Nya Quach MD   simvastatin (ZOCOR) 20 MG tablet TK 1 T PO HS 8/3/20   ProviderGalileo MD        Social History:   Social History     Tobacco Use   • Smoking status: Former Smoker     Types: Cigarettes     Start date: 1979     Quit date: 2007     Years since quitting: 15.7   • Smokeless tobacco: Never Used   Substance Use Topics   • Alcohol use: Yes     Comment: 3-4 x per year   • Drug use: No     Recent travel: no     Review of Systems:  Review of Systems   Constitutional: Negative for chills and fever.   HENT: Negative for congestion, ear pain and sore throat.    Eyes: Negative for pain.   Respiratory: Positive for cough. Negative for chest tightness and shortness of breath.    Cardiovascular: Negative for chest pain.   Gastrointestinal: Negative for abdominal pain, diarrhea, nausea and vomiting.   Genitourinary: Negative for flank pain and hematuria.   Musculoskeletal: Negative for joint swelling.   Skin: Negative for pallor.   Neurological: Positive for weakness (generalized). Negative for seizures and headaches.   All other systems reviewed and are negative.       Physical Exam:  /62   Pulse 55   Temp 98.4 °F (36.9 °C) (Oral)   Resp 18   Ht 172.7 cm (68\")   Wt 107 kg (235 lb 10.8 oz)   LMP  (LMP Unknown)   SpO2 99%   BMI 35.83 kg/m²     Physical Exam  Vitals and nursing note reviewed.   Constitutional:       General: She is not in acute distress.     Appearance: Normal appearance. She is not toxic-appearing.   HENT:      Head: Normocephalic and atraumatic.      Jaw: There is normal jaw occlusion.   Eyes:      General: Lids are normal.      Extraocular Movements: Extraocular movements intact.      " Conjunctiva/sclera: Conjunctivae normal.      Pupils: Pupils are equal, round, and reactive to light.   Cardiovascular:      Rate and Rhythm: Normal rate and regular rhythm.      Pulses: Normal pulses.      Heart sounds: Normal heart sounds.   Pulmonary:      Effort: Pulmonary effort is normal. No respiratory distress.      Breath sounds: Normal breath sounds. No wheezing or rhonchi.   Abdominal:      General: Abdomen is flat.      Palpations: Abdomen is soft.      Tenderness: There is no abdominal tenderness. There is no guarding or rebound.   Musculoskeletal:         General: Normal range of motion.      Cervical back: Normal range of motion and neck supple.      Right lower leg: No edema.      Left lower leg: No edema.   Skin:     General: Skin is warm and dry.   Neurological:      Mental Status: She is alert and oriented to person, place, and time. Mental status is at baseline.   Psychiatric:         Mood and Affect: Mood normal.                Medications in the Emergency Department:  Medications   sodium chloride 0.9 % flush 10 mL (has no administration in time range)   sodium chloride 0.9 % bolus 1,000 mL (0 mL Intravenous Stopped 9/14/22 6768)        Labs  Lab Results (last 24 hours)     Procedure Component Value Units Date/Time    CBC & Differential [[id number]]  (Abnormal) Collected: 09/14/22 1243    Specimen: Blood Updated: 09/14/22 1314    Narrative:      The following orders were created for panel order CBC & Differential.  Procedure                               Abnormality         Status                     ---------                               -----------         ------                     CBC Auto Differential[[id number]]        Abnormal            Final result                 Please view results for these tests on the individual orders.    Comprehensive Metabolic Panel [[id number]] Collected: 09/14/22 1243    Specimen: Blood Updated: 09/14/22 1338     Glucose 99 mg/dL      BUN 13 mg/dL       Creatinine 0.72 mg/dL      Sodium 139 mmol/L      Potassium 3.6 mmol/L      Chloride 101 mmol/L      CO2 27.4 mmol/L      Calcium 9.0 mg/dL      Total Protein 7.5 g/dL      Albumin 4.10 g/dL      ALT (SGPT) 14 U/L      AST (SGOT) 14 U/L      Alkaline Phosphatase 108 U/L      Total Bilirubin 0.6 mg/dL      Globulin 3.4 gm/dL      A/G Ratio 1.2 g/dL      BUN/Creatinine Ratio 18.1     Anion Gap 10.6 mmol/L      eGFR 95.3 mL/min/1.73      Comment: National Kidney Foundation and American Society of Nephrology (ASN) Task Force recommended calculation based on the Chronic Kidney Disease Epidemiology Collaboration (CKD-EPI) equation refit without adjustment for race.       Narrative:      GFR Normal >60  Chronic Kidney Disease <60  Kidney Failure <15      Troponin [[id number]]  (Normal) Collected: 09/14/22 1243    Specimen: Blood Updated: 09/14/22 1335     Troponin T <0.010 ng/mL     Narrative:      Troponin T Reference Range:  <= 0.03 ng/mL-   Negative for AMI  >0.03 ng/mL-     Abnormal for myocardial necrosis.  Clinicians would have to utilize clinical acumen, EKG, Troponin and serial changes to determine if it is an Acute Myocardial Infarction or myocardial injury due to an underlying chronic condition.       Results may be falsely decreased if patient taking Biotin.      Magnesium [[id number]]  (Normal) Collected: 09/14/22 1243    Specimen: Blood Updated: 09/14/22 1338     Magnesium 2.2 mg/dL     Urinalysis With Microscopic If Indicated (No Culture) - Urine, Clean Catch [[id number]]  (Normal) Collected: 09/14/22 1243    Specimen: Urine, Clean Catch Updated: 09/14/22 1317     Color, UA Yellow     Appearance, UA Clear     pH, UA 5.5     Specific Gravity, UA 1.016     Glucose, UA Negative     Ketones, UA Negative     Bilirubin, UA Negative     Blood, UA Negative     Protein, UA Negative     Leuk Esterase, UA Negative     Nitrite, UA Negative     Urobilinogen, UA 1.0 E.U./dL    Narrative:      Urine microscopic not indicated.     CBC Auto Differential [[id number]]  (Abnormal) Collected: 09/14/22 1243    Specimen: Blood Updated: 09/14/22 1314     WBC 9.53 10*3/mm3      RBC 4.28 10*6/mm3      Hemoglobin 13.4 g/dL      Hematocrit 39.1 %      MCV 91.4 fL      MCH 31.3 pg      MCHC 34.3 g/dL      RDW 12.6 %      RDW-SD 42.2 fl      MPV 9.6 fL      Platelets 345 10*3/mm3      Neutrophil % 71.9 %      Lymphocyte % 18.7 %      Monocyte % 6.8 %      Eosinophil % 1.5 %      Basophil % 0.2 %      Immature Grans % 0.9 %      Neutrophils, Absolute 6.85 10*3/mm3      Lymphocytes, Absolute 1.78 10*3/mm3      Monocytes, Absolute 0.65 10*3/mm3      Eosinophils, Absolute 0.14 10*3/mm3      Basophils, Absolute 0.02 10*3/mm3      Immature Grans, Absolute 0.09 10*3/mm3      nRBC 0.0 /100 WBC     TSH Rfx On Abnormal To Free T4 [[id number]]  (Normal) Collected: 09/14/22 1243    Specimen: Blood Updated: 09/14/22 1355     TSH 1.120 uIU/mL     Influenza Antigen, Rapid - Swab, Nasopharynx [[id number]]  (Normal) Collected: 09/14/22 1355    Specimen: Swab from Nasopharynx Updated: 09/14/22 1451     Influenza A Ag, EIA Negative     Influenza B Ag, EIA Negative    COVID-19,APTIMA PANTHER(CRISTEL),[facility], NP/OP SWAB IN UTM/VTM/SALINE TRANSPORT MEDIA,24 HR TAT - Swab, Nasal Cavity [[id number]] Collected: 09/14/22 1355    Specimen: Swab from Nasal Cavity Updated: 09/14/22 1424           Imaging:  XR Chest 1 View    Result Date: 9/14/2022  PROCEDURE: XR CHEST 1 VW  COMPARISON: None  INDICATIONS: Weak/Dizzy/AMS triage protocol  FINDINGS:  The heart is normal in size.  The lungs are well-expanded and free of infiltrates.  Bony structures appear intact.       No active disease is seen.       DARBY CRABTREE MD       Electronically Signed and Approved By: DARBY CRABTREE MD on 9/14/2022 at 13:30               Procedures:  Procedures    Progress                            Medical Decision Making:  MDM  Number of Diagnoses or Management Options  Generalized weakness  Diagnosis  management comments: In summary this is a 62-year-old female who presents to the emergency department for evaluation of generalized weakness and a self-reported low blood pressure in the 90s.  She does also endorse new cough.  Work-up including CBC, CMP, TSH are all unremarkable.  Influenza test is negative.  Chest x-ray is clear.  COVID-19 test is pending.  Patient is otherwise well-appearing and her blood pressure has been stable in the emergency department.  She is currently being treated for vertigo and has already had an MRI regarding this vertigo.  Very strict return to ER and follow-up instructions have been provided to the patient.      Patient Progress  Patient progress: (15:24 EDT Updated patient on results and plan. Patient expressed understanding and agreement. All questions answered at this time.   )       Final diagnoses:   Generalized weakness        Disposition:  ED Disposition     ED Disposition   Discharge    Condition   Stable    Comment   --             This medical record created using voice recognition software.        I, Anna Cantu, am scribing for Dr. Olivier Green. Information recorded by the scribe has been verified and validated by the provider.       Cantu, Anna C  09/14/22 1329       Cantu, Anna C  09/14/22 1524       Olivier Green MD  09/14/22 2741

## 2022-11-22 ENCOUNTER — TRANSCRIBE ORDERS (OUTPATIENT)
Dept: ADMINISTRATIVE | Facility: HOSPITAL | Age: 62
End: 2022-11-22

## 2022-11-22 DIAGNOSIS — Z12.31 SCREENING MAMMOGRAM FOR HIGH-RISK PATIENT: Primary | ICD-10-CM

## 2022-11-22 DIAGNOSIS — Z12.31 ENCOUNTER FOR SCREENING MAMMOGRAM FOR BREAST CANCER: Primary | ICD-10-CM

## 2023-02-14 ENCOUNTER — HOSPITAL ENCOUNTER (OUTPATIENT)
Dept: MAMMOGRAPHY | Facility: HOSPITAL | Age: 63
Discharge: HOME | End: 2023-02-14
Admitting: GENERAL PRACTICE
Payer: MEDICAID

## 2023-02-14 DIAGNOSIS — Z12.31 SCREENING MAMMOGRAM FOR HIGH-RISK PATIENT: ICD-10-CM

## 2023-02-14 PROCEDURE — 77063 BREAST TOMOSYNTHESIS BI: CPT

## 2023-02-14 PROCEDURE — 77067 SCR MAMMO BI INCL CAD: CPT

## 2023-04-28 ENCOUNTER — TRANSCRIBE ORDERS (OUTPATIENT)
Dept: ADMINISTRATIVE | Facility: HOSPITAL | Age: 63
End: 2023-04-28
Payer: MEDICAID

## 2023-04-28 DIAGNOSIS — Z86.718 HISTORY OF VENOUS THROMBOSIS: ICD-10-CM

## 2023-04-28 DIAGNOSIS — M25.511 CHRONIC RIGHT SHOULDER PAIN: Primary | ICD-10-CM

## 2023-04-28 DIAGNOSIS — M79.602 PAIN IN LEFT ARM: ICD-10-CM

## 2023-04-28 DIAGNOSIS — G89.29 CHRONIC RIGHT SHOULDER PAIN: Primary | ICD-10-CM

## 2023-05-04 ENCOUNTER — HOSPITAL ENCOUNTER (OUTPATIENT)
Dept: CARDIOLOGY | Facility: HOSPITAL | Age: 63
Discharge: HOME | End: 2023-05-04
Payer: MEDICAID

## 2023-05-04 DIAGNOSIS — G89.29 CHRONIC RIGHT SHOULDER PAIN: ICD-10-CM

## 2023-05-04 DIAGNOSIS — Z86.718 HISTORY OF VENOUS THROMBOSIS: ICD-10-CM

## 2023-05-04 DIAGNOSIS — M79.602 PAIN IN LEFT ARM: ICD-10-CM

## 2023-05-04 DIAGNOSIS — M25.511 CHRONIC RIGHT SHOULDER PAIN: ICD-10-CM

## 2023-05-04 LAB
BH CV UPPER VENOUS LEFT AXILLARY AUGMENT: NORMAL
BH CV UPPER VENOUS LEFT AXILLARY COMPRESS: NORMAL
BH CV UPPER VENOUS LEFT AXILLARY PHASIC: NORMAL
BH CV UPPER VENOUS LEFT AXILLARY SPONT: NORMAL
BH CV UPPER VENOUS LEFT BASILIC FOREARM COMPRESS: NORMAL
BH CV UPPER VENOUS LEFT BASILIC UPPER COMPRESS: NORMAL
BH CV UPPER VENOUS LEFT BRACHIAL COMPRESS: NORMAL
BH CV UPPER VENOUS LEFT CEPHALIC FOREARM COMPRESS: NORMAL
BH CV UPPER VENOUS LEFT CEPHALIC UPPER COMPRESS: NORMAL
BH CV UPPER VENOUS LEFT INTERNAL JUGULAR AUGMENT: NORMAL
BH CV UPPER VENOUS LEFT INTERNAL JUGULAR COMPRESS: NORMAL
BH CV UPPER VENOUS LEFT INTERNAL JUGULAR PHASIC: NORMAL
BH CV UPPER VENOUS LEFT INTERNAL JUGULAR SPONT: NORMAL
BH CV UPPER VENOUS LEFT RADIAL COMPRESS: NORMAL
BH CV UPPER VENOUS LEFT SUBCLAVIAN AUGMENT: NORMAL
BH CV UPPER VENOUS LEFT SUBCLAVIAN COMPRESS: NORMAL
BH CV UPPER VENOUS LEFT SUBCLAVIAN PHASIC: NORMAL
BH CV UPPER VENOUS LEFT SUBCLAVIAN SPONT: NORMAL
BH CV UPPER VENOUS LEFT ULNAR COMPRESS: NORMAL
BH CV UPPER VENOUS RIGHT INTERNAL JUGULAR AUGMENT: NORMAL
BH CV UPPER VENOUS RIGHT INTERNAL JUGULAR COMPRESS: NORMAL
BH CV UPPER VENOUS RIGHT INTERNAL JUGULAR PHASIC: NORMAL
BH CV UPPER VENOUS RIGHT INTERNAL JUGULAR SPONT: NORMAL
BH CV UPPER VENOUS RIGHT SUBCLAVIAN AUGMENT: NORMAL
BH CV UPPER VENOUS RIGHT SUBCLAVIAN COMPRESS: NORMAL
BH CV UPPER VENOUS RIGHT SUBCLAVIAN PHASIC: NORMAL
BH CV UPPER VENOUS RIGHT SUBCLAVIAN SPONT: NORMAL
MAXIMAL PREDICTED HEART RATE: 159 BPM
STRESS TARGET HR: 135 BPM

## 2023-05-04 PROCEDURE — 93971 EXTREMITY STUDY: CPT

## 2023-05-25 ENCOUNTER — TRANSCRIBE ORDERS (OUTPATIENT)
Dept: LAB | Facility: HOSPITAL | Age: 63
End: 2023-05-25

## 2023-05-25 ENCOUNTER — LAB (OUTPATIENT)
Dept: LAB | Facility: HOSPITAL | Age: 63
End: 2023-05-25
Payer: MEDICAID

## 2023-05-25 DIAGNOSIS — E78.5 HYPERLIPIDEMIA, UNSPECIFIED HYPERLIPIDEMIA TYPE: ICD-10-CM

## 2023-05-25 DIAGNOSIS — M25.561 RIGHT KNEE PAIN, UNSPECIFIED CHRONICITY: ICD-10-CM

## 2023-05-25 DIAGNOSIS — L03.011 CELLULITIS OF RIGHT THUMB: ICD-10-CM

## 2023-05-25 DIAGNOSIS — R73.01 IMPAIRED FASTING GLUCOSE: ICD-10-CM

## 2023-05-25 DIAGNOSIS — E55.9 AVITAMINOSIS D: ICD-10-CM

## 2023-05-25 DIAGNOSIS — E53.9 VITAMIN B-COMPLEX DEFICIENCY: ICD-10-CM

## 2023-05-25 DIAGNOSIS — I10 ESSENTIAL HYPERTENSION, MALIGNANT: ICD-10-CM

## 2023-05-25 DIAGNOSIS — M25.561 RIGHT KNEE PAIN, UNSPECIFIED CHRONICITY: Primary | ICD-10-CM

## 2023-05-25 LAB
25(OH)D3 SERPL-MCNC: 25.6 NG/ML (ref 30–100)
ALBUMIN SERPL-MCNC: 4.1 G/DL (ref 3.5–5.2)
ALBUMIN/GLOB SERPL: 1.9 G/DL
ALP SERPL-CCNC: 95 U/L (ref 39–117)
ALT SERPL W P-5'-P-CCNC: 19 U/L (ref 1–33)
ANION GAP SERPL CALCULATED.3IONS-SCNC: 9.8 MMOL/L (ref 5–15)
AST SERPL-CCNC: 21 U/L (ref 1–32)
BASOPHILS # BLD AUTO: 0.03 10*3/MM3 (ref 0–0.2)
BASOPHILS NFR BLD AUTO: 0.5 % (ref 0–1.5)
BILIRUB SERPL-MCNC: 0.5 MG/DL (ref 0–1.2)
BUN SERPL-MCNC: 14 MG/DL (ref 8–23)
BUN/CREAT SERPL: 19.4 (ref 7–25)
CALCIUM SPEC-SCNC: 9.7 MG/DL (ref 8.6–10.5)
CHLORIDE SERPL-SCNC: 104 MMOL/L (ref 98–107)
CHOLEST SERPL-MCNC: 172 MG/DL (ref 0–200)
CK SERPL-CCNC: 70 U/L (ref 20–180)
CO2 SERPL-SCNC: 28.2 MMOL/L (ref 22–29)
CREAT SERPL-MCNC: 0.72 MG/DL (ref 0.57–1)
DEPRECATED RDW RBC AUTO: 44.3 FL (ref 37–54)
EGFRCR SERPLBLD CKD-EPI 2021: 95.3 ML/MIN/1.73
EOSINOPHIL # BLD AUTO: 0.14 10*3/MM3 (ref 0–0.4)
EOSINOPHIL NFR BLD AUTO: 2.4 % (ref 0.3–6.2)
ERYTHROCYTE [DISTWIDTH] IN BLOOD BY AUTOMATED COUNT: 13.1 % (ref 12.3–15.4)
GLOBULIN UR ELPH-MCNC: 2.2 GM/DL
GLUCOSE SERPL-MCNC: 98 MG/DL (ref 65–99)
HBA1C MFR BLD: 5.6 % (ref 4.8–5.6)
HCT VFR BLD AUTO: 39.1 % (ref 34–46.6)
HDLC SERPL-MCNC: 52 MG/DL (ref 40–60)
HGB BLD-MCNC: 13.2 G/DL (ref 12–15.9)
IMM GRANULOCYTES # BLD AUTO: 0.01 10*3/MM3 (ref 0–0.05)
IMM GRANULOCYTES NFR BLD AUTO: 0.2 % (ref 0–0.5)
LDLC SERPL CALC-MCNC: 104 MG/DL (ref 0–100)
LDLC/HDLC SERPL: 1.97 {RATIO}
LYMPHOCYTES # BLD AUTO: 1.69 10*3/MM3 (ref 0.7–3.1)
LYMPHOCYTES NFR BLD AUTO: 29 % (ref 19.6–45.3)
MCH RBC QN AUTO: 30.9 PG (ref 26.6–33)
MCHC RBC AUTO-ENTMCNC: 33.8 G/DL (ref 31.5–35.7)
MCV RBC AUTO: 91.6 FL (ref 79–97)
MONOCYTES # BLD AUTO: 0.47 10*3/MM3 (ref 0.1–0.9)
MONOCYTES NFR BLD AUTO: 8.1 % (ref 5–12)
NEUTROPHILS NFR BLD AUTO: 3.48 10*3/MM3 (ref 1.7–7)
NEUTROPHILS NFR BLD AUTO: 59.8 % (ref 42.7–76)
NRBC BLD AUTO-RTO: 0 /100 WBC (ref 0–0.2)
PLATELET # BLD AUTO: 279 10*3/MM3 (ref 140–450)
PMV BLD AUTO: 10.1 FL (ref 6–12)
POTASSIUM SERPL-SCNC: 5.3 MMOL/L (ref 3.5–5.2)
PROT SERPL-MCNC: 6.3 G/DL (ref 6–8.5)
RBC # BLD AUTO: 4.27 10*6/MM3 (ref 3.77–5.28)
SODIUM SERPL-SCNC: 142 MMOL/L (ref 136–145)
T4 FREE SERPL-MCNC: 1.33 NG/DL (ref 0.93–1.7)
TRIGL SERPL-MCNC: 87 MG/DL (ref 0–150)
TSH SERPL DL<=0.05 MIU/L-ACNC: 1.35 UIU/ML (ref 0.27–4.2)
VIT B12 BLD-MCNC: 605 PG/ML (ref 211–946)
VLDLC SERPL-MCNC: 16 MG/DL (ref 5–40)
WBC NRBC COR # BLD: 5.82 10*3/MM3 (ref 3.4–10.8)

## 2023-05-25 PROCEDURE — 36415 COLL VENOUS BLD VENIPUNCTURE: CPT

## 2023-05-25 PROCEDURE — 83036 HEMOGLOBIN GLYCOSYLATED A1C: CPT

## 2023-05-25 PROCEDURE — 82607 VITAMIN B-12: CPT

## 2023-05-25 PROCEDURE — 80061 LIPID PANEL: CPT

## 2023-05-25 PROCEDURE — 85025 COMPLETE CBC W/AUTO DIFF WBC: CPT

## 2023-05-25 PROCEDURE — 82306 VITAMIN D 25 HYDROXY: CPT

## 2023-05-25 PROCEDURE — 82550 ASSAY OF CK (CPK): CPT

## 2023-05-25 PROCEDURE — 80053 COMPREHEN METABOLIC PANEL: CPT

## 2023-05-25 PROCEDURE — 84443 ASSAY THYROID STIM HORMONE: CPT

## 2023-05-25 PROCEDURE — 84439 ASSAY OF FREE THYROXINE: CPT

## 2023-07-27 ENCOUNTER — TRANSCRIBE ORDERS (OUTPATIENT)
Dept: ADMINISTRATIVE | Facility: HOSPITAL | Age: 63
End: 2023-07-27
Payer: COMMERCIAL

## 2023-07-27 DIAGNOSIS — M25.561 ACUTE PAIN OF RIGHT KNEE: Primary | ICD-10-CM

## 2023-07-31 ENCOUNTER — OFFICE VISIT (OUTPATIENT)
Dept: ORTHOPEDIC SURGERY | Facility: CLINIC | Age: 63
End: 2023-07-31
Payer: COMMERCIAL

## 2023-07-31 VITALS
OXYGEN SATURATION: 97 % | BODY MASS INDEX: 37.44 KG/M2 | DIASTOLIC BLOOD PRESSURE: 72 MMHG | SYSTOLIC BLOOD PRESSURE: 105 MMHG | WEIGHT: 247 LBS | HEIGHT: 68 IN

## 2023-07-31 DIAGNOSIS — M25.461 EFFUSION OF RIGHT KNEE: ICD-10-CM

## 2023-07-31 DIAGNOSIS — M17.11 PRIMARY OSTEOARTHRITIS OF RIGHT KNEE: ICD-10-CM

## 2023-07-31 DIAGNOSIS — M25.561 RIGHT KNEE PAIN, UNSPECIFIED CHRONICITY: Primary | ICD-10-CM

## 2023-07-31 RX ORDER — TRIAMCINOLONE ACETONIDE 40 MG/ML
40 INJECTION, SUSPENSION INTRA-ARTICULAR; INTRAMUSCULAR
Status: COMPLETED | OUTPATIENT
Start: 2023-07-31 | End: 2023-07-31

## 2023-07-31 RX ORDER — IBUPROFEN 600 MG/1
600 TABLET ORAL EVERY 6 HOURS PRN
COMMUNITY

## 2023-07-31 RX ORDER — LIDOCAINE HYDROCHLORIDE 10 MG/ML
5 INJECTION, SOLUTION INFILTRATION; PERINEURAL
Status: COMPLETED | OUTPATIENT
Start: 2023-07-31 | End: 2023-07-31

## 2023-07-31 RX ADMIN — LIDOCAINE HYDROCHLORIDE 5 ML: 10 INJECTION, SOLUTION INFILTRATION; PERINEURAL at 09:42

## 2023-07-31 RX ADMIN — TRIAMCINOLONE ACETONIDE 40 MG: 40 INJECTION, SUSPENSION INTRA-ARTICULAR; INTRAMUSCULAR at 09:42

## 2023-08-03 ENCOUNTER — PATIENT ROUNDING (BHMG ONLY) (OUTPATIENT)
Dept: ORTHOPEDIC SURGERY | Facility: CLINIC | Age: 63
End: 2023-08-03
Payer: COMMERCIAL

## 2023-09-13 ENCOUNTER — HOSPITAL ENCOUNTER (OUTPATIENT)
Dept: MRI IMAGING | Facility: HOSPITAL | Age: 63
Discharge: HOME | End: 2023-09-13
Admitting: GENERAL PRACTICE
Payer: COMMERCIAL

## 2023-09-13 DIAGNOSIS — M25.561 ACUTE PAIN OF RIGHT KNEE: ICD-10-CM

## 2023-09-13 PROCEDURE — 73721 MRI JNT OF LWR EXTRE W/O DYE: CPT

## 2023-11-01 ENCOUNTER — OFFICE VISIT (OUTPATIENT)
Dept: ORTHOPEDIC SURGERY | Facility: CLINIC | Age: 63
End: 2023-11-01
Payer: COMMERCIAL

## 2023-11-01 VITALS
HEART RATE: 63 BPM | BODY MASS INDEX: 37.89 KG/M2 | WEIGHT: 250 LBS | HEIGHT: 68 IN | DIASTOLIC BLOOD PRESSURE: 79 MMHG | SYSTOLIC BLOOD PRESSURE: 123 MMHG | OXYGEN SATURATION: 98 %

## 2023-11-01 DIAGNOSIS — M17.11 PRIMARY OSTEOARTHRITIS OF RIGHT KNEE: Primary | ICD-10-CM

## 2023-11-01 DIAGNOSIS — M25.561 RIGHT KNEE PAIN, UNSPECIFIED CHRONICITY: ICD-10-CM

## 2023-11-01 NOTE — PROGRESS NOTES
"Chief Complaint  Follow-up of the Right Knee    Subjective          Mabel Grangre presents to [facility]   History of Present Illness    Mabel Granger presents today for a follow-up of her right knee.  Patient has right knee arthritis and we have treated conservatively.  Today, she states her right knee is really bothering her. She reports a popping and grinding sensation in her knee with some intermittent pain. She received about three weeks of relief after her previous injection but once she went upstairs she had the pain return. She takes motrin for her pain. She had an MRI about a month ago of her knee that was ordered by her PCP.     No Known Allergies     Social History     Socioeconomic History    Marital status:    Tobacco Use    Smoking status: Former     Types: Cigarettes     Start date:      Quit date:      Years since quittin.8    Smokeless tobacco: Never   Substance and Sexual Activity    Alcohol use: Yes     Comment: 3-4 x per year    Drug use: No    Sexual activity: Defer        I reviewed the patient's chief complaint, history of present illness, review of systems, past medical history, surgical history, family history, social history, medications, and allergy list.     REVIEW OF SYSTEMS    Constitutional: Denies fevers, chills, weight loss  Cardiovascular: Denies chest pain, shortness of breath  Skin: Denies rashes, acute skin changes  Neurologic: Denies headache, loss of consciousness  MSK: Right knee pain    Objective   Vital Signs:   /79   Pulse 63   Ht 172.7 cm (68\")   Wt 113 kg (250 lb)   SpO2 98%   BMI 38.01 kg/m²     Body mass index is 38.01 kg/m².    Physical Exam    General: Alert. No acute distress.   Right lower extremity: Tenderness to the medial and lateral joint lines.  5 degrees shy of full knee extension.  Knee flexion 110.  Knee extensor mechanism intact.  Knee stable to varus and valgus stress.  Patellar crepitus with " motion.  Calf soft, nontender.  Knee stable to anterior and posterior drawer.  Demonstrates active ankle plantarflexion dorsiflexion.  Sensation intact over dorsal and plantar foot.  Palpable pedal pulses.    Procedures    Imaging Results (Most Recent)       None             PROCEDURE:  MRI KNEE RIGHT  WO CONTRAST     COMPARISON: None     INDICATIONS:  MEDIAL AND LATERAL RIGHT KNEE PAIN SHOOTING PAIN INTO LOWER LEG, SWELLING, DIFFICULTY   BEARING WEIGHT, UNABLE TO FULLY EXTEND AND WEAKNESS SINCE 3/2023.                         TECHNIQUE:    A complete multi-planar MRI was performed.       FINDINGS:          The cruciate ligaments are intact.  There is outward bowing the medial collateral ligament due to   arthropathy within the medial compartment and peripheral subluxation of the body segment of the   medial meniscus.  There is no medial collateral ligament tear.  The components of the posterior   lateral corner of the knee are intact.  The distal iliotibial band insertion is intact.  The   anterior extensor mechanism of the knee is intact.     Complex chronic degenerative type changes of the medial and lateral menisci are noted.  There are   extensive changes associated with the medial meniscus.  There is evidence for a displaced fragment   which extends into the medial gutter.  There is also peripheral subluxation of the body segment   into the medial gutter.     Advanced tricompartmental osteoarthritic degenerative changes are identified with evidence for   articular cartilage irregularity/fissuring, subchondral edema/cystic change, and osteophytosis.    Scattered full-thickness articular cartilage loss is seen throughout the knee.  There is   significant joint space narrowing with loss of joint space height involving the medial compartment   and associated prominent osteophytosis.     A small joint effusion is observed. No significant focal abnormal bone marrow signal is identified.   The cortical margins are  intact.  A multilobulated Baker's cyst is noted.     IMPRESSION:                 1. Advanced tricompartmental osteoarthritis.  2. Complex chronic or degenerative meniscal tears are noted involving both the medial and lateral   menisci.  There are extensive changes associated with the medial meniscus.  There is evidence for a   displaced fragment which extends in the medial gutter.  There is also peripheral subluxation of the   body segment into the medial gutter.           Assessment and Plan    Diagnoses and all orders for this visit:    1. Primary osteoarthritis of right knee (Primary)    2. Right knee pain, unspecified chronicity        Mabel Granger presents today for follow-up of her right knee arthritis of her treating conservatively.  We reviewed her recent right knee MRI which showed advanced tricompartmental osteoarthritis and complex chronic or degenerative meniscal tears to the medial and lateral menisci.  We discussed the severity of patient's right knee arthritis.  We discussed continuing conservative management versus possible surgical management.  Patient expressed understanding elected to continue with surgical management at this time.  We discussed steroid injections and gel injections, patient elected to hold off at this time.  Patient will continue with her home exercises.  Patient will continue with Motrin over-the-counter.    Follow up in 3 weeks for reevaluation.  We will obtain new x-rays of the right knee at next visit.       Call or return if symptoms worsen or patient has any concerns.       Follow Up   Return in 3 weeks (on 11/22/2023), or after PA approval of gel injection.  Patient was given instructions and counseling regarding her condition or for health maintenance advice. Please see specific information pulled into the AVS if appropriate.     Norma Funez PA-C  11/01/23  16:46 EDT

## 2023-11-22 ENCOUNTER — OFFICE VISIT (OUTPATIENT)
Dept: ORTHOPEDIC SURGERY | Facility: CLINIC | Age: 63
End: 2023-11-22
Payer: COMMERCIAL

## 2023-11-22 ENCOUNTER — PREP FOR SURGERY (OUTPATIENT)
Dept: OTHER | Facility: HOSPITAL | Age: 63
End: 2023-11-22
Payer: COMMERCIAL

## 2023-11-22 VITALS
DIASTOLIC BLOOD PRESSURE: 87 MMHG | HEART RATE: 66 BPM | WEIGHT: 250 LBS | OXYGEN SATURATION: 96 % | HEIGHT: 68 IN | SYSTOLIC BLOOD PRESSURE: 138 MMHG | BODY MASS INDEX: 37.89 KG/M2

## 2023-11-22 DIAGNOSIS — M17.11 PRIMARY OSTEOARTHRITIS OF RIGHT KNEE: ICD-10-CM

## 2023-11-22 DIAGNOSIS — M25.561 RIGHT KNEE PAIN, UNSPECIFIED CHRONICITY: Primary | ICD-10-CM

## 2023-11-22 DIAGNOSIS — M17.11 PRIMARY OSTEOARTHRITIS OF RIGHT KNEE: Primary | ICD-10-CM

## 2023-11-22 RX ORDER — CEFAZOLIN SODIUM IN 0.9 % NACL 3 G/100 ML
3 INTRAVENOUS SOLUTION, PIGGYBACK (ML) INTRAVENOUS ONCE
OUTPATIENT
Start: 2023-11-22 | End: 2023-11-22

## 2023-11-22 RX ORDER — TRANEXAMIC ACID 10 MG/ML
1000 INJECTION, SOLUTION INTRAVENOUS ONCE
Status: CANCELLED | OUTPATIENT
Start: 2023-11-22 | End: 2023-11-22

## 2023-11-22 RX ORDER — CEFAZOLIN SODIUM 2 G/100ML
2 INJECTION, SOLUTION INTRAVENOUS ONCE
OUTPATIENT
Start: 2023-11-22 | End: 2023-11-22

## 2023-11-22 NOTE — H&P (VIEW-ONLY)
"Chief Complaint  Pain and Follow-up of the Right Knee    Subjective          Mabel Granger presents to [facility] for   History of Present Illness    The patient presents here today for follow up evaluation of the right knee. She has been treating her right knee osteoarthritis conservatively. She continues to have pain in her right knee. She admits to popping sounds in the knee. She received about three weeks of relief after her previous injection but once she went upstairs she had the pain return.     No Known Allergies     Social History     Socioeconomic History    Marital status:    Tobacco Use    Smoking status: Former     Types: Cigarettes     Start date:      Quit date:      Years since quittin.9    Smokeless tobacco: Never   Substance and Sexual Activity    Alcohol use: Yes     Comment: 3-4 x per year    Drug use: No    Sexual activity: Defer        I reviewed the patient's chief complaint, history of present illness, review of systems, past medical history, surgical history, family history, social history, medications, and allergy list.     REVIEW OF SYSTEMS    Constitutional: Denies fevers, chills, weight loss  Cardiovascular: Denies chest pain, shortness of breath  Skin: Denies rashes, acute skin changes  Neurologic: Denies headache, loss of consciousness  MSK: right knee pain      Objective   Vital Signs:   /87   Pulse 66   Ht 172.7 cm (68\")   Wt 113 kg (250 lb)   SpO2 96%   BMI 38.01 kg/m²     Body mass index is 38.01 kg/m².    Physical Exam    General: Alert. No acute distress.   Right lower extremity: Tenderness to the medial and lateral joint lines.  5 degrees shy of full knee extension.  Knee flexion 110.  Knee extensor mechanism intact.  Knee stable to varus and valgus stress.  Patellar crepitus with motion.  Calf soft, nontender.  Knee stable to anterior and posterior drawer.  Demonstrates active ankle plantarflexion dorsiflexion.  " Sensation intact over dorsal and plantar foot.  Palpable pedal pulses.     Procedures    Imaging Results (Most Recent)       Procedure Component Value Units Date/Time    XR Knee 4+ View Right [[id number]] Resulted: 11/22/23 1500     Updated: 11/22/23 1500    Narrative:      Indications: Follow-up right knee arthritis    Views: Weightbearing AP, PA flexion, lateral, sunrise right knee    Findings: Advanced tricompartmental degenerative changes.  No fractures.    Osteophyte formation noted.  Varus alignment, increases with flexion.    Comparative Data: Comparative data found and reviewed today                     Assessment and Plan        XR Knee 4+ View Right    Result Date: 11/22/2023  Narrative: Indications: Follow-up right knee arthritis Views: Weightbearing AP, PA flexion, lateral, sunrise right knee Findings: Advanced tricompartmental degenerative changes.  No fractures.  Osteophyte formation noted.  Varus alignment, increases with flexion. Comparative Data: Comparative data found and reviewed today      Diagnoses and all orders for this visit:    1. Right knee pain, unspecified chronicity (Primary)  -     XR Knee 4+ View Right    2. Primary osteoarthritis of right knee    Discussed the treatment options with the patient, operative vs non-operative. I reviewed the x-rays that were obtained today with the patient. Discussed the risks and benefits of a Right Total Knee Arthroplasty with Sandy Robot vs continuing conservative treatment with home exercises, medication and injections. The patient expressed understanding and wished to proceed with operative treatment.         Discussed surgery., Risks/benefits discussed with patient including, but not limited to: infection, bleeding, neurovascular damage, malunion, nonunion, aesthetic deformity, need for further surgery, and death., Discussed with patient the implant type being used during surgery and patient understands., Surgery pamphlet given., Call or return if  worsening symptoms., DME order for a 3 in 1 given today due to patient will be confined to one room/level of the home that does not offer a toilet during postop recovery. , and IFC can help extend pain relief and hopefully reduce need for pain medication. TENS is used to control break through pain. NMES is used to help and strengthen weak muscles and prevent atrophy.    Scribed for Yousuf Cornejo MD by Magda Sommers  11/22/2023   14:28 EST         Follow Up       2 weeks postoperatively.      Patient was given instructions and counseling regarding her condition or for health maintenance advice. Please see specific information pulled into the AVS if appropriate.       I have personally performed the services described in this document as scribed by the above individual and it is both accurate and complete.     Yousuf Cornejo MD  11/22/23  15:10 EST

## 2023-11-22 NOTE — PROGRESS NOTES
"Chief Complaint  Pain and Follow-up of the Right Knee    Subjective          Mabel Granger presents to [facility] for   History of Present Illness    The patient presents here today for follow up evaluation of the right knee. She has been treating her right knee osteoarthritis conservatively. She continues to have pain in her right knee. She admits to popping sounds in the knee. She received about three weeks of relief after her previous injection but once she went upstairs she had the pain return.     No Known Allergies     Social History     Socioeconomic History    Marital status:    Tobacco Use    Smoking status: Former     Types: Cigarettes     Start date:      Quit date:      Years since quittin.9    Smokeless tobacco: Never   Substance and Sexual Activity    Alcohol use: Yes     Comment: 3-4 x per year    Drug use: No    Sexual activity: Defer        I reviewed the patient's chief complaint, history of present illness, review of systems, past medical history, surgical history, family history, social history, medications, and allergy list.     REVIEW OF SYSTEMS    Constitutional: Denies fevers, chills, weight loss  Cardiovascular: Denies chest pain, shortness of breath  Skin: Denies rashes, acute skin changes  Neurologic: Denies headache, loss of consciousness  MSK: right knee pain      Objective   Vital Signs:   /87   Pulse 66   Ht 172.7 cm (68\")   Wt 113 kg (250 lb)   SpO2 96%   BMI 38.01 kg/m²     Body mass index is 38.01 kg/m².    Physical Exam    General: Alert. No acute distress.   Right lower extremity: Tenderness to the medial and lateral joint lines.  5 degrees shy of full knee extension.  Knee flexion 110.  Knee extensor mechanism intact.  Knee stable to varus and valgus stress.  Patellar crepitus with motion.  Calf soft, nontender.  Knee stable to anterior and posterior drawer.  Demonstrates active ankle plantarflexion dorsiflexion.  " Sensation intact over dorsal and plantar foot.  Palpable pedal pulses.     Procedures    Imaging Results (Most Recent)       Procedure Component Value Units Date/Time    XR Knee 4+ View Right [[id number]] Resulted: 11/22/23 1500     Updated: 11/22/23 1500    Narrative:      Indications: Follow-up right knee arthritis    Views: Weightbearing AP, PA flexion, lateral, sunrise right knee    Findings: Advanced tricompartmental degenerative changes.  No fractures.    Osteophyte formation noted.  Varus alignment, increases with flexion.    Comparative Data: Comparative data found and reviewed today                     Assessment and Plan        XR Knee 4+ View Right    Result Date: 11/22/2023  Narrative: Indications: Follow-up right knee arthritis Views: Weightbearing AP, PA flexion, lateral, sunrise right knee Findings: Advanced tricompartmental degenerative changes.  No fractures.  Osteophyte formation noted.  Varus alignment, increases with flexion. Comparative Data: Comparative data found and reviewed today      Diagnoses and all orders for this visit:    1. Right knee pain, unspecified chronicity (Primary)  -     XR Knee 4+ View Right    2. Primary osteoarthritis of right knee    Discussed the treatment options with the patient, operative vs non-operative. I reviewed the x-rays that were obtained today with the patient. Discussed the risks and benefits of a Right Total Knee Arthroplasty with Sandy Robot vs continuing conservative treatment with home exercises, medication and injections. The patient expressed understanding and wished to proceed with operative treatment.         Discussed surgery., Risks/benefits discussed with patient including, but not limited to: infection, bleeding, neurovascular damage, malunion, nonunion, aesthetic deformity, need for further surgery, and death., Discussed with patient the implant type being used during surgery and patient understands., Surgery pamphlet given., Call or return if  worsening symptoms., DME order for a 3 in 1 given today due to patient will be confined to one room/level of the home that does not offer a toilet during postop recovery. , and IFC can help extend pain relief and hopefully reduce need for pain medication. TENS is used to control break through pain. NMES is used to help and strengthen weak muscles and prevent atrophy.    Scribed for Yousuf Cornejo MD by Magda Sommers  11/22/2023   14:28 EST         Follow Up       2 weeks postoperatively.      Patient was given instructions and counseling regarding her condition or for health maintenance advice. Please see specific information pulled into the AVS if appropriate.       I have personally performed the services described in this document as scribed by the above individual and it is both accurate and complete.     Yousuf Cornejo MD  11/22/23  15:10 EST

## 2023-11-29 DIAGNOSIS — Z47.1 AFTERCARE FOLLOWING RIGHT KNEE JOINT REPLACEMENT SURGERY: Primary | ICD-10-CM

## 2023-11-29 DIAGNOSIS — Z96.651 AFTERCARE FOLLOWING RIGHT KNEE JOINT REPLACEMENT SURGERY: Primary | ICD-10-CM

## 2023-12-13 ENCOUNTER — PRE-ADMISSION TESTING (OUTPATIENT)
Dept: PREADMISSION TESTING | Facility: HOSPITAL | Age: 63
End: 2023-12-13
Payer: COMMERCIAL

## 2023-12-13 VITALS
HEIGHT: 68 IN | RESPIRATION RATE: 18 BRPM | BODY MASS INDEX: 36.09 KG/M2 | WEIGHT: 238.1 LBS | OXYGEN SATURATION: 96 % | SYSTOLIC BLOOD PRESSURE: 128 MMHG | TEMPERATURE: 97.6 F | DIASTOLIC BLOOD PRESSURE: 68 MMHG | HEART RATE: 53 BPM

## 2023-12-13 DIAGNOSIS — M17.11 PRIMARY OSTEOARTHRITIS OF RIGHT KNEE: ICD-10-CM

## 2023-12-13 LAB
ALBUMIN SERPL-MCNC: 4 G/DL (ref 3.5–5.2)
ALBUMIN/GLOB SERPL: 1.5 G/DL
ALP SERPL-CCNC: 109 U/L (ref 39–117)
ALT SERPL W P-5'-P-CCNC: 14 U/L (ref 1–33)
ANION GAP SERPL CALCULATED.3IONS-SCNC: 10.2 MMOL/L (ref 5–15)
AST SERPL-CCNC: 16 U/L (ref 1–32)
BASOPHILS # BLD AUTO: 0.03 10*3/MM3 (ref 0–0.2)
BASOPHILS NFR BLD AUTO: 0.4 % (ref 0–1.5)
BILIRUB SERPL-MCNC: 0.6 MG/DL (ref 0–1.2)
BILIRUB UR QL STRIP: NEGATIVE
BUN SERPL-MCNC: 13 MG/DL (ref 8–23)
BUN/CREAT SERPL: 18.1 (ref 7–25)
CALCIUM SPEC-SCNC: 9.3 MG/DL (ref 8.6–10.5)
CHLORIDE SERPL-SCNC: 103 MMOL/L (ref 98–107)
CLARITY UR: CLEAR
CO2 SERPL-SCNC: 24.8 MMOL/L (ref 22–29)
COLOR UR: YELLOW
CREAT SERPL-MCNC: 0.72 MG/DL (ref 0.57–1)
DEPRECATED RDW RBC AUTO: 44 FL (ref 37–54)
EGFRCR SERPLBLD CKD-EPI 2021: 94.7 ML/MIN/1.73
EOSINOPHIL # BLD AUTO: 0.17 10*3/MM3 (ref 0–0.4)
EOSINOPHIL NFR BLD AUTO: 2.2 % (ref 0.3–6.2)
ERYTHROCYTE [DISTWIDTH] IN BLOOD BY AUTOMATED COUNT: 12.9 % (ref 12.3–15.4)
GLOBULIN UR ELPH-MCNC: 2.7 GM/DL
GLUCOSE SERPL-MCNC: 107 MG/DL (ref 65–99)
GLUCOSE UR STRIP-MCNC: NEGATIVE MG/DL
HBA1C MFR BLD: 5.7 % (ref 4.8–5.6)
HCT VFR BLD AUTO: 39.7 % (ref 34–46.6)
HGB BLD-MCNC: 13.2 G/DL (ref 12–15.9)
HGB UR QL STRIP.AUTO: NEGATIVE
IMM GRANULOCYTES # BLD AUTO: 0.02 10*3/MM3 (ref 0–0.05)
IMM GRANULOCYTES NFR BLD AUTO: 0.3 % (ref 0–0.5)
INR PPP: 0.99 (ref 0.86–1.15)
KETONES UR QL STRIP: NEGATIVE
LEUKOCYTE ESTERASE UR QL STRIP.AUTO: NEGATIVE
LYMPHOCYTES # BLD AUTO: 1.76 10*3/MM3 (ref 0.7–3.1)
LYMPHOCYTES NFR BLD AUTO: 22.4 % (ref 19.6–45.3)
MCH RBC QN AUTO: 31 PG (ref 26.6–33)
MCHC RBC AUTO-ENTMCNC: 33.2 G/DL (ref 31.5–35.7)
MCV RBC AUTO: 93.2 FL (ref 79–97)
MONOCYTES # BLD AUTO: 0.48 10*3/MM3 (ref 0.1–0.9)
MONOCYTES NFR BLD AUTO: 6.1 % (ref 5–12)
NEUTROPHILS NFR BLD AUTO: 5.4 10*3/MM3 (ref 1.7–7)
NEUTROPHILS NFR BLD AUTO: 68.6 % (ref 42.7–76)
NITRITE UR QL STRIP: NEGATIVE
NRBC BLD AUTO-RTO: 0 /100 WBC (ref 0–0.2)
PH UR STRIP.AUTO: 5.5 [PH] (ref 5–8)
PLATELET # BLD AUTO: 298 10*3/MM3 (ref 140–450)
PMV BLD AUTO: 9.9 FL (ref 6–12)
POTASSIUM SERPL-SCNC: 4.7 MMOL/L (ref 3.5–5.2)
PROT SERPL-MCNC: 6.7 G/DL (ref 6–8.5)
PROT UR QL STRIP: NEGATIVE
PROTHROMBIN TIME: 13.3 SECONDS (ref 11.8–14.9)
QT INTERVAL: 426 MS
QTC INTERVAL: 386 MS
RBC # BLD AUTO: 4.26 10*6/MM3 (ref 3.77–5.28)
SODIUM SERPL-SCNC: 138 MMOL/L (ref 136–145)
SP GR UR STRIP: 1.02 (ref 1–1.03)
UROBILINOGEN UR QL STRIP: NORMAL
WBC NRBC COR # BLD AUTO: 7.86 10*3/MM3 (ref 3.4–10.8)

## 2023-12-13 PROCEDURE — 83036 HEMOGLOBIN GLYCOSYLATED A1C: CPT

## 2023-12-13 PROCEDURE — 36415 COLL VENOUS BLD VENIPUNCTURE: CPT

## 2023-12-13 PROCEDURE — 93005 ELECTROCARDIOGRAM TRACING: CPT

## 2023-12-13 PROCEDURE — 80053 COMPREHEN METABOLIC PANEL: CPT

## 2023-12-13 PROCEDURE — 81003 URINALYSIS AUTO W/O SCOPE: CPT

## 2023-12-13 PROCEDURE — 85025 COMPLETE CBC W/AUTO DIFF WBC: CPT

## 2023-12-13 PROCEDURE — 85610 PROTHROMBIN TIME: CPT

## 2023-12-13 RX ORDER — FLUCONAZOLE 200 MG/1
200 TABLET ORAL
COMMUNITY
End: 2023-12-22 | Stop reason: HOSPADM

## 2023-12-13 RX ORDER — IBUPROFEN 200 MG
200 TABLET ORAL EVERY 6 HOURS PRN
COMMUNITY
End: 2023-12-22 | Stop reason: HOSPADM

## 2023-12-13 NOTE — SIGNIFICANT NOTE
PT WISHES TO BE DISCHARGED  HOME WITH OUTPT THERAPY. FAMILY TO ASSIST POSTOP   Offered and patient declined

## 2023-12-13 NOTE — DISCHARGE INSTRUCTIONS
IMPORTANT INSTRUCTIONS - PRE-ADMISSION TESTING  DO NOT EAT OR CHEW anything after midnight the night before your procedure.    You may have CLEAR liquids up to 3_ hours prior to ARRIVAL time.   Take the following medications the morning of your procedure with JUST A SIP OF WATER: FLUCONAZOLE    DO NOT BRING your medications to the hospital with you, UNLESS something has changed since your PRE-Admission Testing appointment.  Hold all vitamins, supplements, and NSAIDS (Non- steroidal anti-inflammatory meds) for one week prior to surgery (you MAY take Tylenol or Acetaminophen).  If you are diabetic, check your blood sugar the morning of your procedure. If it is less than 70 or if you are feeling symptomatic, call the following number for further instructions: [phone]  SAME  DAY SURGERY_.  Use your inhalers/nebulizers as usual, the morning of your procedure. BRING YOUR INHALERS with you.   Bring your CPAP or BIPAP to hospital, ONLY IF YOU WILL BE SPENDING THE NIGHT.   Make sure you have a ride home and have someone who will stay with you the day of your procedure after you go home.  If you have any questions, please call your Pre-Admission Testing NurseKODY_ at [phone]_.   Per anesthesia request, do not smoke for 24 hours before your procedure or as instructed by your surgeon.    Clear Liquid Diet        Find out when you need to start a clear liquid diet.   Think of “clear liquids” as anything you could read a newspaper through. This includes things like water, broth, sports drinks, or tea WITHOUT any kind of milk or cream.           Once you are told to start a clear liquid diet, only drink these things until 3 hours before arrival to the hospital or when the hospital says to stop. Total volume limitation: 8 oz.       Clear liquids you CAN drink:   Water   Clear broth: beef, chicken, vegetable, or bone broth with nothing in it   Gatorade   Lemonade or Yasir-aid   Soda   Tea, coffee (NO cream or honey)    Jell-O (without fruit)   Popsicles (without fruit or cream)   Italian ices   Juice without pulp: apple, white, grape   You may use salt, pepper, and sugar    Do NOT drink:   Milk or cream   Soy milk, almond milk, coconut milk, or other non-dairy drinks and   creamers   Milkshakes or smoothies   Tomato juice   Orange juice   Grapefruit juice   Cream soups or any other than broth         Clear Liquid Diet:  Do NOT eat any solid food.  Do NOT eat or suck on mints or candy.  Do NOT chew gum.  Do NOT drink thick liquids like milk or juice with pulp in it.  Do NOT add milk, cream, or anything like soy milk or almond milk to coffee or tea.

## 2023-12-20 ENCOUNTER — ANESTHESIA EVENT (OUTPATIENT)
Dept: PERIOP | Facility: HOSPITAL | Age: 63
End: 2023-12-20
Payer: COMMERCIAL

## 2023-12-21 ENCOUNTER — ANESTHESIA (OUTPATIENT)
Dept: PERIOP | Facility: HOSPITAL | Age: 63
End: 2023-12-21
Payer: COMMERCIAL

## 2023-12-21 ENCOUNTER — HOSPITAL ENCOUNTER (OUTPATIENT)
Facility: HOSPITAL | Age: 63
Discharge: HOME | End: 2023-12-22
Attending: STUDENT IN AN ORGANIZED HEALTH CARE EDUCATION/TRAINING PROGRAM | Admitting: STUDENT IN AN ORGANIZED HEALTH CARE EDUCATION/TRAINING PROGRAM
Payer: COMMERCIAL

## 2023-12-21 ENCOUNTER — ANESTHESIA EVENT CONVERTED (OUTPATIENT)
Dept: ANESTHESIOLOGY | Facility: HOSPITAL | Age: 63
End: 2023-12-21
Payer: COMMERCIAL

## 2023-12-21 ENCOUNTER — APPOINTMENT (OUTPATIENT)
Dept: GENERAL RADIOLOGY | Facility: HOSPITAL | Age: 63
End: 2023-12-21
Payer: COMMERCIAL

## 2023-12-21 DIAGNOSIS — R26.2 DIFFICULTY IN WALKING: Primary | ICD-10-CM

## 2023-12-21 DIAGNOSIS — M17.11 PRIMARY OSTEOARTHRITIS OF RIGHT KNEE: ICD-10-CM

## 2023-12-21 LAB
HCT VFR BLD AUTO: 38.7 % (ref 34–46.6)
HGB BLD-MCNC: 12.5 G/DL (ref 12–15.9)

## 2023-12-21 PROCEDURE — 25010000002 DEXAMETHASONE PER 1 MG: Performed by: NURSE ANESTHETIST, CERTIFIED REGISTERED

## 2023-12-21 PROCEDURE — 25810000003 LACTATED RINGERS PER 1000 ML: Performed by: ANESTHESIOLOGY

## 2023-12-21 PROCEDURE — 25010000002 KETOROLAC TROMETHAMINE PER 15 MG: Performed by: STUDENT IN AN ORGANIZED HEALTH CARE EDUCATION/TRAINING PROGRAM

## 2023-12-21 PROCEDURE — 97161 PT EVAL LOW COMPLEX 20 MIN: CPT

## 2023-12-21 PROCEDURE — 25010000002 MIDAZOLAM PER 1MG: Performed by: ANESTHESIOLOGY

## 2023-12-21 PROCEDURE — 25010000002 MEPERIDINE PER 100 MG: Performed by: NURSE ANESTHETIST, CERTIFIED REGISTERED

## 2023-12-21 PROCEDURE — 25010000002 SUGAMMADEX 200 MG/2ML SOLUTION: Performed by: NURSE ANESTHETIST, CERTIFIED REGISTERED

## 2023-12-21 PROCEDURE — 25010000002 CEFAZOLIN IN DEXTROSE 2-4 GM/100ML-% SOLUTION: Performed by: STUDENT IN AN ORGANIZED HEALTH CARE EDUCATION/TRAINING PROGRAM

## 2023-12-21 PROCEDURE — 85014 HEMATOCRIT: CPT | Performed by: STUDENT IN AN ORGANIZED HEALTH CARE EDUCATION/TRAINING PROGRAM

## 2023-12-21 PROCEDURE — 25010000002 HYDROMORPHONE 1 MG/ML SOLUTION: Performed by: NURSE ANESTHETIST, CERTIFIED REGISTERED

## 2023-12-21 PROCEDURE — C1776 JOINT DEVICE (IMPLANTABLE): HCPCS | Performed by: STUDENT IN AN ORGANIZED HEALTH CARE EDUCATION/TRAINING PROGRAM

## 2023-12-21 PROCEDURE — 25010000002 EPINEPHRINE 1 MG/ML SOLUTION: Performed by: STUDENT IN AN ORGANIZED HEALTH CARE EDUCATION/TRAINING PROGRAM

## 2023-12-21 PROCEDURE — 25010000002 CEFAZOLIN IN DEXTROSE 2000 MG/ 100 ML SOLUTION: Performed by: STUDENT IN AN ORGANIZED HEALTH CARE EDUCATION/TRAINING PROGRAM

## 2023-12-21 PROCEDURE — 25810000003 LACTATED RINGERS PER 1000 ML: Performed by: STUDENT IN AN ORGANIZED HEALTH CARE EDUCATION/TRAINING PROGRAM

## 2023-12-21 PROCEDURE — 94799 UNLISTED PULMONARY SVC/PX: CPT

## 2023-12-21 PROCEDURE — 73560 X-RAY EXAM OF KNEE 1 OR 2: CPT

## 2023-12-21 PROCEDURE — 20985 CPTR-ASST DIR MS PX: CPT | Performed by: STUDENT IN AN ORGANIZED HEALTH CARE EDUCATION/TRAINING PROGRAM

## 2023-12-21 PROCEDURE — C1713 ANCHOR/SCREW BN/BN,TIS/BN: HCPCS | Performed by: STUDENT IN AN ORGANIZED HEALTH CARE EDUCATION/TRAINING PROGRAM

## 2023-12-21 PROCEDURE — 27447 TOTAL KNEE ARTHROPLASTY: CPT | Performed by: STUDENT IN AN ORGANIZED HEALTH CARE EDUCATION/TRAINING PROGRAM

## 2023-12-21 PROCEDURE — 25010000002 PROPOFOL 10 MG/ML EMULSION: Performed by: NURSE ANESTHETIST, CERTIFIED REGISTERED

## 2023-12-21 PROCEDURE — 99203 OFFICE O/P NEW LOW 30 MIN: CPT | Performed by: STUDENT IN AN ORGANIZED HEALTH CARE EDUCATION/TRAINING PROGRAM

## 2023-12-21 PROCEDURE — 25010000002 BUPIVACAINE (PF) 0.5 % SOLUTION: Performed by: ANESTHESIOLOGY

## 2023-12-21 PROCEDURE — 25010000002 ONDANSETRON PER 1 MG: Performed by: NURSE ANESTHETIST, CERTIFIED REGISTERED

## 2023-12-21 PROCEDURE — 94761 N-INVAS EAR/PLS OXIMETRY MLT: CPT

## 2023-12-21 PROCEDURE — 25010000002 HYDROMORPHONE PER 4 MG: Performed by: NURSE ANESTHETIST, CERTIFIED REGISTERED

## 2023-12-21 PROCEDURE — 25010000002 ROPIVACAINE PER 1 MG: Performed by: STUDENT IN AN ORGANIZED HEALTH CARE EDUCATION/TRAINING PROGRAM

## 2023-12-21 PROCEDURE — 25010000002 MORPHINE PER 10 MG: Performed by: STUDENT IN AN ORGANIZED HEALTH CARE EDUCATION/TRAINING PROGRAM

## 2023-12-21 PROCEDURE — 85018 HEMOGLOBIN: CPT | Performed by: STUDENT IN AN ORGANIZED HEALTH CARE EDUCATION/TRAINING PROGRAM

## 2023-12-21 PROCEDURE — 25010000002 FENTANYL CITRATE (PF) 50 MCG/ML SOLUTION: Performed by: NURSE ANESTHETIST, CERTIFIED REGISTERED

## 2023-12-21 DEVICE — IMPLANTABLE DEVICE: Type: IMPLANTABLE DEVICE | Site: KNEE | Status: FUNCTIONAL

## 2023-12-21 DEVICE — STEM TIB/KN PERSONA CMT 5D SZD RT: Type: IMPLANTABLE DEVICE | Site: KNEE | Status: FUNCTIONAL

## 2023-12-21 DEVICE — COMP FEM/KN PERSONA CR CMT NRW SZ8 RT: Type: IMPLANTABLE DEVICE | Site: KNEE | Status: FUNCTIONAL

## 2023-12-21 DEVICE — CMT BONE PALACOS R HI/VISC 1X40: Type: IMPLANTABLE DEVICE | Site: KNEE | Status: FUNCTIONAL

## 2023-12-21 DEVICE — ART/SRF KN PERSONA/VE MC CD 8TO9 10MM RT: Type: IMPLANTABLE DEVICE | Site: KNEE | Status: FUNCTIONAL

## 2023-12-21 DEVICE — CAP TOTL KN CMT PRIMARY W/ROSA: Type: IMPLANTABLE DEVICE | Site: KNEE | Status: FUNCTIONAL

## 2023-12-21 RX ORDER — FERROUS SULFATE 325(65) MG
325 TABLET ORAL
Status: DISCONTINUED | OUTPATIENT
Start: 2023-12-21 | End: 2023-12-22 | Stop reason: HOSPADM

## 2023-12-21 RX ORDER — SODIUM CHLORIDE, SODIUM LACTATE, POTASSIUM CHLORIDE, CALCIUM CHLORIDE 600; 310; 30; 20 MG/100ML; MG/100ML; MG/100ML; MG/100ML
100 INJECTION, SOLUTION INTRAVENOUS CONTINUOUS
Status: DISCONTINUED | OUTPATIENT
Start: 2023-12-21 | End: 2023-12-22 | Stop reason: HOSPADM

## 2023-12-21 RX ORDER — ONDANSETRON 4 MG/1
4 TABLET, ORALLY DISINTEGRATING ORAL EVERY 6 HOURS PRN
Status: DISCONTINUED | OUTPATIENT
Start: 2023-12-21 | End: 2023-12-22 | Stop reason: HOSPADM

## 2023-12-21 RX ORDER — AMOXICILLIN 250 MG
2 CAPSULE ORAL 2 TIMES DAILY
Status: DISCONTINUED | OUTPATIENT
Start: 2023-12-21 | End: 2023-12-22 | Stop reason: HOSPADM

## 2023-12-21 RX ORDER — MEPERIDINE HYDROCHLORIDE 25 MG/ML
12.5 INJECTION INTRAMUSCULAR; INTRAVENOUS; SUBCUTANEOUS
Status: DISCONTINUED | OUTPATIENT
Start: 2023-12-21 | End: 2023-12-21 | Stop reason: HOSPADM

## 2023-12-21 RX ORDER — CEFAZOLIN SODIUM 2 G/100ML
2 INJECTION, SOLUTION INTRAVENOUS ONCE
Status: COMPLETED | OUTPATIENT
Start: 2023-12-21 | End: 2023-12-21

## 2023-12-21 RX ORDER — FAMOTIDINE 20 MG/1
40 TABLET, FILM COATED ORAL DAILY
Status: DISCONTINUED | OUTPATIENT
Start: 2023-12-21 | End: 2023-12-22 | Stop reason: HOSPADM

## 2023-12-21 RX ORDER — HYDROMORPHONE HYDROCHLORIDE 2 MG/ML
INJECTION, SOLUTION INTRAMUSCULAR; INTRAVENOUS; SUBCUTANEOUS AS NEEDED
Status: DISCONTINUED | OUTPATIENT
Start: 2023-12-21 | End: 2023-12-21 | Stop reason: SURG

## 2023-12-21 RX ORDER — MIDAZOLAM HYDROCHLORIDE 2 MG/2ML
2 INJECTION, SOLUTION INTRAMUSCULAR; INTRAVENOUS ONCE
Status: COMPLETED | OUTPATIENT
Start: 2023-12-21 | End: 2023-12-21

## 2023-12-21 RX ORDER — PROPOFOL 10 MG/ML
VIAL (ML) INTRAVENOUS AS NEEDED
Status: DISCONTINUED | OUTPATIENT
Start: 2023-12-21 | End: 2023-12-21 | Stop reason: SURG

## 2023-12-21 RX ORDER — FENTANYL CITRATE 50 UG/ML
INJECTION, SOLUTION INTRAMUSCULAR; INTRAVENOUS AS NEEDED
Status: DISCONTINUED | OUTPATIENT
Start: 2023-12-21 | End: 2023-12-21 | Stop reason: SURG

## 2023-12-21 RX ORDER — PROMETHAZINE HYDROCHLORIDE 25 MG/1
25 SUPPOSITORY RECTAL ONCE AS NEEDED
Status: DISCONTINUED | OUTPATIENT
Start: 2023-12-21 | End: 2023-12-21 | Stop reason: HOSPADM

## 2023-12-21 RX ORDER — CEFAZOLIN SODIUM 2 G/100ML
2000 INJECTION, SOLUTION INTRAVENOUS EVERY 8 HOURS
Qty: 200 ML | Refills: 0 | Status: COMPLETED | OUTPATIENT
Start: 2023-12-21 | End: 2023-12-22

## 2023-12-21 RX ORDER — OXYCODONE HYDROCHLORIDE 5 MG/1
10 TABLET ORAL EVERY 4 HOURS PRN
Status: DISCONTINUED | OUTPATIENT
Start: 2023-12-21 | End: 2023-12-22 | Stop reason: HOSPADM

## 2023-12-21 RX ORDER — ROCURONIUM BROMIDE 10 MG/ML
INJECTION, SOLUTION INTRAVENOUS AS NEEDED
Status: DISCONTINUED | OUTPATIENT
Start: 2023-12-21 | End: 2023-12-21 | Stop reason: SURG

## 2023-12-21 RX ORDER — PROMETHAZINE HYDROCHLORIDE 12.5 MG/1
25 TABLET ORAL ONCE AS NEEDED
Status: DISCONTINUED | OUTPATIENT
Start: 2023-12-21 | End: 2023-12-21 | Stop reason: HOSPADM

## 2023-12-21 RX ORDER — CEFAZOLIN SODIUM IN 0.9 % NACL 3 G/100 ML
3 INTRAVENOUS SOLUTION, PIGGYBACK (ML) INTRAVENOUS ONCE
Status: DISCONTINUED | OUTPATIENT
Start: 2023-12-21 | End: 2023-12-21 | Stop reason: SDUPTHER

## 2023-12-21 RX ORDER — ONDANSETRON 2 MG/ML
4 INJECTION INTRAMUSCULAR; INTRAVENOUS ONCE AS NEEDED
Status: DISCONTINUED | OUTPATIENT
Start: 2023-12-21 | End: 2023-12-21 | Stop reason: HOSPADM

## 2023-12-21 RX ORDER — MAGNESIUM HYDROXIDE 1200 MG/15ML
LIQUID ORAL AS NEEDED
Status: DISCONTINUED | OUTPATIENT
Start: 2023-12-21 | End: 2023-12-21 | Stop reason: HOSPADM

## 2023-12-21 RX ORDER — ACETAMINOPHEN 500 MG
1000 TABLET ORAL ONCE
Status: COMPLETED | OUTPATIENT
Start: 2023-12-21 | End: 2023-12-21

## 2023-12-21 RX ORDER — SODIUM CHLORIDE, SODIUM LACTATE, POTASSIUM CHLORIDE, CALCIUM CHLORIDE 600; 310; 30; 20 MG/100ML; MG/100ML; MG/100ML; MG/100ML
9 INJECTION, SOLUTION INTRAVENOUS CONTINUOUS PRN
Status: DISCONTINUED | OUTPATIENT
Start: 2023-12-21 | End: 2023-12-22 | Stop reason: HOSPADM

## 2023-12-21 RX ORDER — DEXAMETHASONE SODIUM PHOSPHATE 4 MG/ML
INJECTION, SOLUTION INTRA-ARTICULAR; INTRALESIONAL; INTRAMUSCULAR; INTRAVENOUS; SOFT TISSUE AS NEEDED
Status: DISCONTINUED | OUTPATIENT
Start: 2023-12-21 | End: 2023-12-21 | Stop reason: SURG

## 2023-12-21 RX ORDER — ACETAMINOPHEN 500 MG
1000 TABLET ORAL EVERY 8 HOURS
Qty: 12 TABLET | Refills: 0 | Status: DISCONTINUED | OUTPATIENT
Start: 2023-12-21 | End: 2023-12-22 | Stop reason: HOSPADM

## 2023-12-21 RX ORDER — OXYCODONE HYDROCHLORIDE 5 MG/1
5 TABLET ORAL
Status: COMPLETED | OUTPATIENT
Start: 2023-12-21 | End: 2023-12-21

## 2023-12-21 RX ORDER — NALOXONE HCL 0.4 MG/ML
0.4 VIAL (ML) INJECTION
Status: DISCONTINUED | OUTPATIENT
Start: 2023-12-21 | End: 2023-12-22 | Stop reason: HOSPADM

## 2023-12-21 RX ORDER — ONDANSETRON 2 MG/ML
INJECTION INTRAMUSCULAR; INTRAVENOUS AS NEEDED
Status: DISCONTINUED | OUTPATIENT
Start: 2023-12-21 | End: 2023-12-21 | Stop reason: SURG

## 2023-12-21 RX ORDER — ONDANSETRON 2 MG/ML
4 INJECTION INTRAMUSCULAR; INTRAVENOUS EVERY 6 HOURS PRN
Status: DISCONTINUED | OUTPATIENT
Start: 2023-12-21 | End: 2023-12-22 | Stop reason: HOSPADM

## 2023-12-21 RX ORDER — LIDOCAINE HYDROCHLORIDE 20 MG/ML
INJECTION, SOLUTION EPIDURAL; INFILTRATION; INTRACAUDAL; PERINEURAL AS NEEDED
Status: DISCONTINUED | OUTPATIENT
Start: 2023-12-21 | End: 2023-12-21 | Stop reason: SURG

## 2023-12-21 RX ORDER — OXYCODONE HYDROCHLORIDE 5 MG/1
5 TABLET ORAL EVERY 4 HOURS PRN
Status: DISCONTINUED | OUTPATIENT
Start: 2023-12-21 | End: 2023-12-22 | Stop reason: HOSPADM

## 2023-12-21 RX ORDER — BUPIVACAINE HYDROCHLORIDE 5 MG/ML
INJECTION, SOLUTION EPIDURAL; INTRACAUDAL
Status: COMPLETED | OUTPATIENT
Start: 2023-12-21 | End: 2023-12-21

## 2023-12-21 RX ADMIN — OXYCODONE 5 MG: 5 TABLET ORAL at 09:48

## 2023-12-21 RX ADMIN — HYDROMORPHONE HYDROCHLORIDE 0.5 MG: 1 INJECTION, SOLUTION INTRAMUSCULAR; INTRAVENOUS; SUBCUTANEOUS at 09:20

## 2023-12-21 RX ADMIN — CEFAZOLIN SODIUM 2 G: 2 INJECTION, SOLUTION INTRAVENOUS at 07:16

## 2023-12-21 RX ADMIN — LIDOCAINE HYDROCHLORIDE 80 MG: 20 INJECTION, SOLUTION EPIDURAL; INFILTRATION; INTRACAUDAL; PERINEURAL at 07:13

## 2023-12-21 RX ADMIN — MIDAZOLAM HYDROCHLORIDE 2 MG: 1 INJECTION, SOLUTION INTRAMUSCULAR; INTRAVENOUS at 06:40

## 2023-12-21 RX ADMIN — PROPOFOL 225 MCG/KG/MIN: 10 INJECTION, EMULSION INTRAVENOUS at 07:13

## 2023-12-21 RX ADMIN — ROCURONIUM BROMIDE 50 MG: 50 INJECTION INTRAVENOUS at 07:13

## 2023-12-21 RX ADMIN — MEPERIDINE HYDROCHLORIDE 12.5 MG: 25 INJECTION INTRAMUSCULAR; INTRAVENOUS; SUBCUTANEOUS at 09:23

## 2023-12-21 RX ADMIN — DEXAMETHASONE SODIUM PHOSPHATE 4 MG: 4 INJECTION, SOLUTION INTRAMUSCULAR; INTRAVENOUS at 07:13

## 2023-12-21 RX ADMIN — FENTANYL CITRATE 50 MCG: 50 INJECTION, SOLUTION INTRAMUSCULAR; INTRAVENOUS at 07:11

## 2023-12-21 RX ADMIN — CEFAZOLIN SODIUM 2000 MG: 2 INJECTION, SOLUTION INTRAVENOUS at 23:12

## 2023-12-21 RX ADMIN — SODIUM CHLORIDE, POTASSIUM CHLORIDE, SODIUM LACTATE AND CALCIUM CHLORIDE 9 ML/HR: 600; 310; 30; 20 INJECTION, SOLUTION INTRAVENOUS at 06:34

## 2023-12-21 RX ADMIN — DOCUSATE SODIUM 50MG AND SENNOSIDES 8.6MG 2 TABLET: 8.6; 5 TABLET, FILM COATED ORAL at 12:09

## 2023-12-21 RX ADMIN — SUGAMMADEX 200 MG: 100 INJECTION, SOLUTION INTRAVENOUS at 08:58

## 2023-12-21 RX ADMIN — HYDROMORPHONE HYDROCHLORIDE 0.5 MG: 2 INJECTION, SOLUTION INTRAMUSCULAR; INTRAVENOUS; SUBCUTANEOUS at 07:40

## 2023-12-21 RX ADMIN — HYDROMORPHONE HYDROCHLORIDE 0.5 MG: 1 INJECTION, SOLUTION INTRAMUSCULAR; INTRAVENOUS; SUBCUTANEOUS at 09:42

## 2023-12-21 RX ADMIN — OXYCODONE 5 MG: 5 TABLET ORAL at 10:06

## 2023-12-21 RX ADMIN — FERROUS SULFATE TAB 325 MG (65 MG ELEMENTAL FE) 325 MG: 325 (65 FE) TAB at 12:09

## 2023-12-21 RX ADMIN — ACETAMINOPHEN 1000 MG: 500 TABLET ORAL at 17:33

## 2023-12-21 RX ADMIN — OXYCODONE 10 MG: 5 TABLET ORAL at 12:08

## 2023-12-21 RX ADMIN — ACETAMINOPHEN 1000 MG: 500 TABLET ORAL at 06:40

## 2023-12-21 RX ADMIN — HYDROMORPHONE HYDROCHLORIDE 0.5 MG: 2 INJECTION, SOLUTION INTRAMUSCULAR; INTRAVENOUS; SUBCUTANEOUS at 07:59

## 2023-12-21 RX ADMIN — SODIUM CHLORIDE, POTASSIUM CHLORIDE, SODIUM LACTATE AND CALCIUM CHLORIDE 100 ML/HR: 600; 310; 30; 20 INJECTION, SOLUTION INTRAVENOUS at 19:29

## 2023-12-21 RX ADMIN — FAMOTIDINE 40 MG: 20 TABLET, FILM COATED ORAL at 12:08

## 2023-12-21 RX ADMIN — BUPIVACAINE HYDROCHLORIDE 30 ML: 5 INJECTION, SOLUTION EPIDURAL; INTRACAUDAL at 07:00

## 2023-12-21 RX ADMIN — ONDANSETRON 4 MG: 2 INJECTION INTRAMUSCULAR; INTRAVENOUS at 08:49

## 2023-12-21 RX ADMIN — PROPOFOL 200 MG: 10 INJECTION, EMULSION INTRAVENOUS at 07:13

## 2023-12-21 RX ADMIN — OXYCODONE 10 MG: 5 TABLET ORAL at 17:33

## 2023-12-21 RX ADMIN — OXYCODONE 10 MG: 5 TABLET ORAL at 21:26

## 2023-12-21 RX ADMIN — CEFAZOLIN SODIUM 2000 MG: 2 INJECTION, SOLUTION INTRAVENOUS at 17:32

## 2023-12-21 NOTE — ANESTHESIA PREPROCEDURE EVALUATION
Anesthesia Evaluation     Patient summary reviewed and Nursing notes reviewed   history of anesthetic complications:  PONV  NPO Solid Status: > 8 hours             Airway   Mallampati: II  TM distance: >3 FB  Neck ROM: full  no difficulty expected  Dental - normal exam         Pulmonary    (+) a smoker Former,  Cardiovascular     (+) hypertension, dysrhythmias Bradycardia, DVT      Neuro/Psych- negative ROS  GI/Hepatic/Renal/Endo    (+) obesity    Musculoskeletal (-) negative ROS    Abdominal    Substance History - negative use     OB/GYN negative ob/gyn ROS         Other                          Anesthesia Plan    ASA 2     general with block     intravenous induction     Anesthetic plan, risks, benefits, and alternatives have been provided, discussed and informed consent has been obtained with: patient.    Plan discussed with CRNA.

## 2023-12-21 NOTE — CONSULTS
[facility]   Hospitalist Consult Note  Date: 2023   Patient Name: Mabel Granger  : 1960  MRN: [id number]  Primary Care Physician:  Nayana Cadena MD  Referring Physician: Nayana Cadena MD  Date of admission: 2023    Subjective   Subjective     Reason for Consult/ Chief Complaint: Right knee pain    HPI:  Mabel Granger is a 63 y.o. female with past medical history of hypertension who presented with complaints of right knee pain.  Patient failed medical management including joint injections with persistent pain.  She also complained of having popping sounds in the knee.  Given the conservative management of osteoarthritis of right knee failed, patient underwent Right total knee arthroplasty with Sandy robot today on 2023.  Patient tolerated the procedure well.    Medicine was consulted for medical management of chronic issues.  Patient was evaluated after the surgical procedure.  Vital signs showed patient with T36.3 HR 67, RR 16 blood pressure 112/71 and saturation 99% on 3 L/min of oxygen via NC.    Review of Systems   All systems were reviewed and negative except for: Right knee pain    Personal History     Past Medical History:  Past Medical History:   Diagnosis Date    Arthritis     KNEE    Breast atypical hyperplasia     LEFT LUMPECTOMY    DVT (deep venous thrombosis)     UPPER EXTREMITY    Hypertension     TKAES MED AS NEEDED    PONV (postoperative nausea and vomiting)     Rash     PT  CURRENTLY ON MED FOR  YEAST INFECTION  UNDERNEATH BREAST-DR. SIMMONS ([facility]) NOTIFIED         Past Surgical History:  Past Surgical History:   Procedure Laterality Date    BREAST BIOPSY      BREAST LUMPECTOMY Left 2019    Procedure: left breast needle localized lumpectomy for atypical duct hyperplasia.;  Surgeon: Nya Quach MD;  Location: [facility] OR [facility];  Service: General    BUNIONECTOMY      HYSTERECTOMY      MAMMO STEREOTACTIC BREAST BIOPSY 1ST W WO DEVICE       TOTAL KNEE ARTHROPLASTY Right 2023    Procedure: RIGHT TOTAL KNEE ARTHROPLASTY WITH ALEXANDRIA ROBOT.;  Surgeon: Yousuf Cornejo MD;  Location: [facility] MAIN OR;  Service: Robotics - Ortho;  Laterality: Right;    TUBAL ABDOMINAL LIGATION          Family History:   Family History   Problem Relation Age of Onset    Colon cancer Mother 55    Pancreatic cancer Maternal Grandmother 80    Throat cancer Maternal Grandfather 75    Malig Hyperthermia Neg Hx        Social History:   Social History     Socioeconomic History    Marital status:    Tobacco Use    Smoking status: Former     Types: Cigarettes     Start date:      Quit date:      Years since quittin.9    Smokeless tobacco: Never   Vaping Use    Vaping Use: Never used   Substance and Sexual Activity    Alcohol use: Yes     Comment: 3-4 x per year    Drug use: No    Sexual activity: Defer       Home Medications:  Ascorbic Acid, Cholecalciferol, Multiple Vitamins-Minerals, NON FORMULARY, acetaminophen, fluconazole, ibuprofen, and losartan    Allergies:  No Known Allergies    Review of Systems   All systems were reviewed and negative except for: Right knee pain    Objective    Objective     Vitals:   Temp:  [97.3 °F (36.3 °C)-98.5 °F (36.9 °C)] 97.3 °F (36.3 °C)  Heart Rate:  [51-86] 58  Resp:  [16-18] 16  BP: (108-143)/(48-97) 115/69  Flow (L/min):  [3] 3    Physical Exam:   Constitutional: Awake, alert, no acute distress   Respiratory: Clear to auscultation bilaterally, nonlabored respirations    Cardiovascular: RRR, no murmurs, rubs, or gallops, palpable pedal pulses bilaterally   Gastrointestinal: Positive bowel sounds, soft, nontender, nondistended   Musculoskeletal: No bilateral ankle edema, no clubbing or cyanosis to extremities.    Result Review    Result Review:  I have personally reviewed the results from the time of this admission to 2023 13:58 EST and agree with these findings:  []  Laboratory  []  Microbiology  []  Radiology  []   EKG/Telemetry   []  Cardiology/Vascular   []  Pathology  []  Old records  []  Other:    Assessment & Plan   Assessment / Plan     Assessment/Plan:  Primary osteoarthritis of right knee s/p right total knee arthroplasty with Sandy robot; POD day 0  History of hypertension  Prediabetes    -Patient is being treated in MedSurg unit, medicine was consulted for management of chronic issues.  -Patient underwent right total knee arthroplasty with Sandy robot today on 12/29/2023, tolerated the procedure well.  -On oxycodone as needed for pain.  -On DVT prophylaxis as per orthopedic surgeon.  -Patient is on losartan 25 mg daily at home as needed for blood pressure greater than 140/90.  Holding currently given patient's blood pressure is normal.  -Patient was found to be prediabetic with HbA1c 5.7 and blood work done on 12/13.  Outpatient follow-up.  -Continue rest of the current management.  -We will obtain CBC, BMP, calcium, phosphorus and magnesium in a.m.  -Medicine team will continue to follow throughout this hospitalization.  -Rest of the management as per orthopedic surgeon.    DVT prophylaxis:  Medical and mechanical DVT prophylaxis orders are present.    CODE STATUS:    Code Status (Patient has no pulse and is not breathing): CPR (Attempt to Resuscitate)  Medical Interventions (Patient has pulse or is breathing): Full Support      Electronically signed by Julio C Coto MD, 12/21/23, 10:39 AM EST.

## 2023-12-21 NOTE — OP NOTE
TOTAL KNEE ARTHROPLASTY WITH ALEXANDRIA ROBOT  Procedure Report    Patient Name:  Mabel Granger  YOB: 1960    Date of Surgery:  12/21/2023     Indications: Mabel is a 63-year-old female with a long history of right knee pain.  She failed nonoperative management.  We discussed the risks, benefits, indications, alternatives to right total knee arthroplasty with [place] robotic assistance.  She elected to proceed with surgical management and consent was obtained.    Pre-op Diagnosis:   Primary osteoarthritis of right knee [M17.11]       Post-Op Diagnosis Codes:     * Primary osteoarthritis of right knee [M17.11]    Procedure/CPT® Codes:      Procedure(s):  RIGHT TOTAL KNEE ARTHROPLASTY WITH ALEXANDRIA ROBOT.    Staff:  Surgeon(s):  Yousuf Cornejo MD    Assistant: Jaz Baron; Katlyn Fraser RN CSA    Surgical Approach: Knee Medial Parapatellar        Anesthesia: General with Block    Estimated Blood Loss: 50 mL    Implants:    Implant Name Type Inv. Item Serial No.  Lot No. LRB No. Used Action   CMT BONE PALACOS R HI/VISC 1X40 - [id number] Implant CMT BONE PALACOS R HI/VISC 1X40  [facility] MEDICAL [id number] Right 1 Implanted   CMT BONE PALACOS R HI/VISC 1X40 - [id number] Implant CMT BONE PALACOS R HI/VISC 1X40  [facility] [id number] Right 1 Implanted   COMP FEM/KN PERSONA CR CMT NRW SZ8 RT - [id number] Implant COMP FEM/KN PERSONA CR CMT NRW SZ8 RT  [employer] [id number] Right 1 Implanted   PAT KN PERSONA ALLPOLY CMT 8X29MM - [id number] Implant PAT KN PERSONA ALLPOLY CMT 8X29MM  [employer] [id number] Right 1 Implanted   STEM TIB/KN PERSONA CMT 5D SZD RT - [id number] Implant STEM TIB/KN PERSONA CMT 5D SZD RT  [employer] [id number] Right 1 Implanted   ART/SRF KN PERSONA/VE MC CD 8TO9 10MM RT - [id number] Implant ART/SRF KN PERSONA/VE MC CD 8TO9 10MM RT  [employer] [id number] Right 1 Implanted       Specimen:          None        Findings: Right knee osteoarthritis    Complications:  None    Description of Procedure: The patient was met in the preoperative holding area and the operative extremity was marked.  The patient received a preoperative peripheral nerve block.  The patient was transported to the operating room and laid supine on the operating table.  General anesthesia was induced without complication.  2 g of preoperative Ancef were administered.  An upper thigh tourniquet was applied.  The right lower extremity was then prepped and draped in the usual sterile fashion.  A timeout was taken to ensure the appropriate patient, procedure, and procedural site.  All were in agreement.  The right lower extremity was exsanguinated and tourniquet was inflated to 300 mmHg.  A legholding device was used for assistance during the case.  A standard midline longitudinal incision was created for an anterior approach to the knee.  Sharp dissection was carried down through the skin and subcutaneous tissues.  The extensor mechanism and retinacular tissues were identified.  A medial parapatellar arthrotomy was created in standard fashion.  A medial release off the proximal tibia was performed and the anterior horn of the medial meniscus was resected.   The fat pad was sharply excised.  The patella was everted and the leg was brought into 90 degrees of flexion.  I  inserted 2 threaded pins for the femoral tracker through the primary incision.  Distally I made 2 stab incisions distal to the tibial tubercle and 2 threaded pins were placed here as well.  The femoral and tibial trackers were then applied.   I then proceeded to navigate the femur and tibia and performed the knee state evaluation in standard fashion using the Sandy robotic assistance device.  I planned my femoral and tibial cuts.  The cutting arm was then brought onto the field with the knee in 90 degrees of flexion.  The distal femoral cutting guide was pinned in place and the cut was completed without complication.  The bony resection was  removed.  I then drilled the  holes for the 4-in-1 cutting block and move the guide arm to the tibia.  This was pinned in place.  Retractors were repositioned and the tibial cut was created without complication.  The 4-in-1 cutting block was then impacted onto the distal femur.  I was satisfied with the rotation of the cutting block relative to the epicondylar axis.  I then performed the anterior, posterior, and chamfer cuts without complication while protecting the collateral ligaments.  Bony resections were removed.  A lamina  was inserted.  The remaining portions of the medial and lateral menisci were sharply excised using the Bovie.    Posterior osteophytes were removed with a curved osteotome.  The PCL retractor was again inserted and the proximal tibia was exposed.  I sized the proximal tibia at D.  I then inserted my size 8 right femoral component trial and size D right tibial component trial with a size 10 trial insert.  The knee was brought into extension.  With these trials in place the knee could achieve full extension and was stable to varus and valgus stress.  The patella tracked centrally with no liftoff of the tibial tray in deep flexion.  The knee was brought back into extension.  The patella was everted.  The patella was measured at 20 mm thickness.  I planned a 7 mm resection.  I utilized the patellar cutting guide and performed a 7 mm cut without complication.  I sized the patella at a 29 and drilled for a symmetric cemented patellar button.  The patella trial was inserted.  The knee was again taken through range of motion.  Tracking was excellent and the knee was stable to varus and valgus stress throughout motion.  I was satisfied with these trial implants.  The rotation of the tibial tray was marked.  The patellar and tibial trials were removed.  The femoral lug holes were drilled in anticipation of cemented components.  The femoral trial was removed.  The PCL retractor was  inserted and the knee was taken into hyperflexion again.  The proximal tibia was then punched in standard fashion in line with the previously marked rotation.  This was done without complication.  The knee was then thoroughly irrigated with normal saline.  A size 8 narrow cemented cruciate retaining femoral component and size D tibial tray were opened.  The cement was mixed on the back table.  I then cemented the tibial and femoral components in standard fashion removing excess cement during the process.  The knee was brought into extension with an 10 mm spacer in place and axial pressure was applied.  The patella was everted.  The patella was cemented in standard fashion without complication.  Excess cement was removed.  The knee was thoroughly irrigated with normal saline followed by irrisept solution.  Periarticular local was injected into the soft tissues.  The cement was allowed to fully dry.  The tibial and femoral trackers were removed. Once dry, the knee was taken through a range of motion again.  The knee achieved full extension, patellar tracking was excellent, and the knee was stable to varus and valgus stress throughout motion.  The knee was brought into flexion and the size 10 spacer was removed.  The size 10  MC polyinsert was opened.  The tibial tray was cleared.  The polyinsert was then impacted without complication and was fully seated.  The knee was again taken through range of motion with the final implants in place.  Full extension could be achieved.  Tracking was excellent.  The knee was stable to varus and valgus stress.  The knee was again irrigated with normal saline solution.  The extensor mechanism and retinacular tissues were closed with #1 Ethibond in interrupted figure-of-eight fashion.  The tourniquet was released and adequate hemostasis was obtained.  Subcutaneous tissues were closed with 0 Vicryl and 2-0 Vicryl.  Skin was closed with skin staples.  Tibial pin sites were closed with  nylon sutures. The wound was dressed with an Aquacel dressing, soft roll, ABD, and an Ace wrap from the foot to the proximal thigh.  The patient awoke from anesthesia without complication and was transferred to the recovery room in stable condition.  All surgical counts were correct.     Assistant: Jaz Baron; Katlyn Fraser RN CSA  was responsible for performing the following activities: Retraction, Suction, Irrigation, Closing, and Placing Dressing and their skilled assistance was necessary for the success of this case.    Yousuf Cornejo MD     Date: 12/21/2023  Time: 09:14 EST

## 2023-12-21 NOTE — ANESTHESIA PROCEDURE NOTES
Peripheral Block      Patient reassessed immediately prior to procedure    Patient location during procedure: pre-op  Reason for block: at surgeon's request and post-op pain management  Preanesthetic Checklist  Completed: patient identified, IV checked, site marked, risks and benefits discussed, surgical consent, monitors and equipment checked, pre-op evaluation and timeout performed  Prep:  Pt Position: supine  Sterile barriers:alcohol skin prep, partial drape, cap, washed/disinfected hands, mask and gloves  Prep: ChloraPrep  Patient monitoring: blood pressure monitoring, continuous pulse oximetry and EKG  Procedure    Sedation: yes  Performed under: local infiltration  Guidance:ultrasound guided and nerve stimulator    ULTRASOUND INTERPRETATION.  Using ultrasound guidance a 20 G gauge needle was placed in close proximity to the nerve, at which point, under ultrasound guidance anesthetic was injected in the area of the nerve and spread of the anesthesia was seen on ultrasound in close proximity thereto.  There were no abnormalities seen on ultrasound; a digital image was taken; and the patient tolerated the procedure with no complications. Images:still images obtained, printed/placed on chart    Laterality:right  Block Type:adductor canal block  Injection Technique:single-shot  Needle Type:echogenic  Needle Gauge:20 G (4in)  Resistance on Injection: none    Medications Used: bupivacaine (PF) (MARCAINE) 0.5 % injection - Injection   30 mL - 12/21/2023 7:00:00 AM      Post Assessment  Injection Assessment: negative aspiration for heme, no paresthesia on injection and incremental injection  Patient Tolerance:comfortable throughout block  Complications:no  Additional Notes  The block or continuous infusion is requested by the referring physician for management of postoperative pain, or pain related to a procedure. Ultrasound guidance (deemed medically necessary). Painless injection, pt was awake and conversant during  the procedure without complications. Needle and surrounding structures visualized throughout procedure. No adverse reactions or complications seen during this period. Post-procedure image showed no signs of complication, and anatomy was consistent with an uncomplicated nerve blockade.

## 2023-12-21 NOTE — THERAPY EVALUATION
Acute Care - Physical Therapy Initial Evaluation   [place]     Patient Name: Mabel Granger  : 1960  MRN: [id number]  Today's Date: 2023     Admit date: 2023     Referring Physician: Julio C Coto MD     Surgery Date:2023   Procedure(s) (LRB):  RIGHT TOTAL KNEE ARTHROPLASTY WITH [place] ROBOT. (Right)          Visit Dx:     ICD-10-CM ICD-9-CM   1. Difficulty in walking  R26.2 719.7   2. Primary osteoarthritis of right knee  M17.11 715.16     Patient Active Problem List   Diagnosis    Atypical ductal hyperplasia of breast    Class 1 obesity    Snoring    UARS (upper airway resistance syndrome)    Degenerative arthritis of right knee     Past Medical History:   Diagnosis Date    Arthritis     KNEE    Breast atypical hyperplasia     LEFT LUMPECTOMY    DVT (deep venous thrombosis)     UPPER EXTREMITY    Hypertension     TKAES MED AS NEEDED    PONV (postoperative nausea and vomiting)     Rash     PT  CURRENTLY ON MED FOR  YEAST INFECTION  UNDERNEATH BREAST-DR. SIMMONS ([facility]) NOTIFIED     Past Surgical History:   Procedure Laterality Date    BREAST BIOPSY      BREAST LUMPECTOMY Left 2019    Procedure: left breast needle localized lumpectomy for atypical duct hyperplasia.;  Surgeon: Nya Quach MD;  [place] [facility] [place] [facility] [place]  Service: General    BUNIONECTOMY      HYSTERECTOMY      MAMMO STEREOTACTIC BREAST BIOPSY 1ST W WO DEVICE      TUBAL ABDOMINAL LIGATION       PT Assessment (last 12 hours)       PT Evaluation and Treatment       Row Name 23 1100          Physical Therapy Time and Intention    Subjective Information complains of;pain  -ARGELIA     Document Type evaluation  -ARGELIA     Mode of Treatment individual therapy;physical therapy  -ARGELIA     Patient Effort good  -ARGELIA     Symptoms Noted During/After Treatment none  -ARGELIA       Row Name 23 1100          General Information    Patient Observations alert;cooperative;agree to therapy  -ARGELIA     Prior Level of Function  independent:;all household mobility;community mobility  -ARGELIA     Equipment Currently Used at Home none  -ARGELIA     Existing Precautions/Restrictions fall;weight bearing  -ARGELIA     Barriers to Rehab none identified  -ARGELIA       Row Name 12/21/23 1100          Living Environment    Current Living Arrangements home  -ARGELIA       Row Name 12/21/23 1100          Cognition    Orientation Status (Cognition) oriented x 3  -ARGELIA       Row Name 12/21/23 1100          Range of Motion Comprehensive    General Range of Motion lower extremity range of motion deficits identified  15-60° knee RLE  -ARGELIA       Row Name 12/21/23 1100          Strength Comprehensive (MMT)    General Manual Muscle Testing (MMT) Assessment lower extremity strength deficits identified  RLE 3-/5  -ARGELIA       Row Name 12/21/23 1100          Mobility    Extremity Weight-bearing Status right lower extremity  -ARGELIA     Right Lower Extremity (Weight-bearing Status) weight-bearing as tolerated (WBAT)  -ARGELIA       Row Name 12/21/23 1100          Bed Mobility    Bed Mobility bed mobility (all) activities;supine-sit  -ARGELIA     All Activities, [place] (Bed Mobility) minimum assist (75% patient effort)  -ARGELIA     Supine-Sit [place] (Bed Mobility) minimum assist (75% patient effort)  -ARGELIA       Row Name 12/21/23 1100          Transfers    Transfers bed-chair transfer;sit-stand transfer  -ARGELIA       Row Name 12/21/23 1100          Bed-Chair Transfer    Bed-Chair [place] (Transfers) minimum assist (75% patient effort)  -ARGELIA     Assistive Device (Bed-Chair Transfers) walker, front-wheeled  -ARGELIA       Row Name 12/21/23 1100          Sit-Stand Transfer    Sit-Stand [place] (Transfers) minimum assist (75% patient effort)  -ARGELIA     Assistive Device (Sit-Stand Transfers) walker, front-wheeled  -ARGELIA       Row Name 12/21/23 1100          Gait/Stairs (Locomotion)    Gait/Stairs Locomotion gait/ambulation assistive device  -ARGELIA     [place] Level (Gait) minimum assist (75% patient  effort)  -ARGELIA     Assistive Device (Gait) walker, front-wheeled  -ARGELIA     Patient was able to Ambulate yes  -ARGELIA     Distance in Feet (Gait) 20  -ARGELIA     Pattern (Gait) step-to  -ARGELIA       Row Name 12/21/23 1100          Safety Issues, Functional Mobility    Impairments Affecting Function (Mobility) balance;pain;range of motion (ROM);strength  -ARGELIA       Row Name 12/21/23 1100          Balance    Balance Assessment standing dynamic balance  -ARGELIA     Dynamic Standing Balance minimal assist  -ARGELIA     Position/Device Used, Standing Balance walker, front-wheeled  -ARGELIA       Row Name             Wound 12/21/23 0730 Right anterior knee Incision    Wound - Properties Group Placement Date: 12/21/23  -BW Placement Time: 0730  -BW Present on Original Admission: N  -BW Side: Right  -BW Orientation: anterior  -BW Location: knee  -BW Primary Wound Type: Incision  -BW    Retired Wound - Properties Group Placement Date: 12/21/23  -BW Placement Time: 0730  -BW Present on Original Admission: N  -BW Side: Right  -BW Orientation: anterior  -BW Location: knee  -BW Primary Wound Type: Incision  -BW    Retired Wound - Properties Group Date first assessed: 12/21/23  -BW Time first assessed: 0730  -BW Present on Original Admission: N  -BW Side: Right  -BW Location: knee  -BW Primary Wound Type: Incision  -BW      Row Name             Wound 12/21/23 0735 Right proximal leg Incision    Wound - Properties Group Placement Date: 12/21/23  -BW Placement Time: 0735  -BW Present on Original Admission: N  -BW Side: Right  -BW Orientation: proximal  -BW Location: leg  -BW Primary Wound Type: Incision  -BW    Retired Wound - Properties Group Placement Date: 12/21/23  -BW Placement Time: 0735  -BW Present on Original Admission: N  -BW Side: Right  -BW Orientation: proximal  -BW Location: leg  -BW Primary Wound Type: Incision  -BW    Retired Wound - Properties Group Date first assessed: 12/21/23  -BW Time first assessed: 0735  -BW Present on Original Admission:  N  -BW Side: Right  -BW Location: leg  -BW Primary Wound Type: Incision  -BW      Row Name 12/21/23 1100          Plan of Care Review    Plan of Care Reviewed With patient  -ARGELIA     Outcome Evaluation Pt presents with decreased ROM, strength, transfers and ambulation.  Skilled PT services will be required to address these mobility deficits.  -ARGELIA       Row Name 12/21/23 1100          Therapy Assessment/Plan (PT)    Patient/Family Therapy Goals Statement (PT) Pt wants to walk without pain  -ARGELIA     Rehab Potential (PT) good, to achieve stated therapy goals  -ARGELIA     Criteria for Skilled Interventions Met (PT) skilled treatment is necessary  -ARGELIA     Therapy Frequency (PT) 2 times/day  -ARGELIA     Predicted Duration of Therapy Intervention (PT) 10 days  -ARGELIA     Problem List (PT) problems related to;balance;mobility;range of motion (ROM);strength;pain  -ARGELIA     Activity Limitations Related to Problem List (PT) unable to ambulate safely;unable to transfer safely  -ARGELIA       Row Name 12/21/23 1100          PT Evaluation Complexity    History, PT Evaluation Complexity no personal factors and/or comorbidities  -ARGELIA     Examination of Body Systems (PT Eval Complexity) total of 4 or more elements  -ARGELIA     Clinical Presentation (PT Evaluation Complexity) stable  -ARGELIA     Clinical Decision Making (PT Evaluation Complexity) low complexity  -ARGELIA     Overall Complexity (PT Evaluation Complexity) low complexity  -ARGELIA       Row Name 12/21/23 1100          Therapy Plan Review/Discharge Plan (PT)    Therapy Plan Review (PT) evaluation/treatment results reviewed;participants voiced agreement with care plan;participants included;patient  -ARGELIA       Row Name 12/21/23 1100          Physical Therapy Goals    Transfer Goal Selection (PT) transfer, PT goal 1  -ARGELIA     Gait Training Goal Selection (PT) gait training, PT goal 1  -ARGELIA     ROM Goal Selection (PT) ROM, PT goal 1  -ARGELIA     Strength Goal Selection (PT) strength, PT goal 1  -ARGELIA       Row Name  12/21/23 1100          Transfer Goal 1 (PT)    Activity/Assistive Device (Transfer Goal 1, PT) transfers, all  -ARGELIA     [place] Level/Cues Needed (Transfer Goal 1, PT) independent  -ARGELIA     Time Frame (Transfer Goal 1, PT) long term goal (LTG);10 days  -ARGELIA       Row Name 12/21/23 1100          Gait Training Goal 1 (PT)    Activity/Assistive Device (Gait Training Goal 1, PT) gait (walking locomotion);walker, rolling  -ARGELIA     [place] Level (Gait Training Goal 1, PT) independent  -ARGELIA     Distance (Gait Training Goal 1, PT) 300  -ARGELIA     Time Frame (Gait Training Goal 1, PT) long term goal (LTG);10 days  -ARGELIA       Row Name 12/21/23 1100          ROM Goal 1 (PT)    ROM Goal 1 (PT) Pt will demonstrate knee ROM from 0-110° on the affected side.  -ARGELIA     Time Frame (ROM Goal 1, PT) long-term goal (LTG);10 days  -ARGELIA       Row Name 12/21/23 1100          Strength Goal 1 (PT)    Strength Goal 1 (PT) Pt will demonstrate knee extension strength of 5/5 on the affected side.  -ARGELIA     Time Frame (Strength Goal 1, PT) long term goal (LTG);10 days  -ARGELIA               User Key  (r) = Recorded By, (t) = Taken By, (c) = Cosigned By      Initials Name Provider Type    Donell Rodarte, RN Registered Nurse    Mark Jimenez PT Physical Therapist                    Physical Therapy Education       Title: PT OT SLP Therapies (Done)       Topic: Physical Therapy (Done)       Point: Mobility training (Done)       Learning Progress Summary             Patient Acceptance, E,TB, VU by ARGELIA at 12/21/2023 1126                         Point: Precautions (Done)       Learning Progress Summary             Patient Acceptance, E,TB, VU by ARGELIA at 12/21/2023 1126                                         User Key       Initials Effective Dates Name Provider Type Discipline    ARGELIA 06/03/21 -  Mark Walter PT Physical Therapist PT                  PT Recommendation and Plan  Anticipated Discharge Disposition (PT): home with outpatient  therapy services  Planned Therapy Interventions (PT): balance training, bed mobility training, gait training, home exercise program, stair training, ROM (range of motion), strengthening, stretching, transfer training  Therapy Frequency (PT): 2 times/day  Plan of Care Reviewed With: patient  Outcome Evaluation: Pt presents with decreased ROM, strength, transfers and ambulation.  Skilled PT services will be required to address these mobility deficits.   Outcome Measures       Row Name 12/21/23 1100             How much help from another person do you currently need...    Turning from your back to your side while in flat bed without using bedrails? 3  -ARGELIA      Moving from lying on back to sitting on the side of a flat bed without bedrails? 3  -ARGELIA      Moving to and from a bed to a chair (including a wheelchair)? 3  -ARGELIA      Standing up from a chair using your arms (e.g., wheelchair, bedside chair)? 3  -ARGELIA      Climbing 3-5 steps with a railing? 2  -ARGELIA      To walk in hospital room? 3  -ARGELIA      AM-PAC 6 Clicks Score (PT) 17  -ARGELIA      Highest Level of Mobility Goal 5 --> Static standing  -ARGELIA         Functional Assessment    Outcome Measure Options AM-PAC 6 Clicks Basic Mobility (PT)  -ARGELIA                User Key  (r) = Recorded By, (t) = Taken By, (c) = Cosigned By      Initials Name Provider Type    Mark Jimenez PT Physical Therapist                     Time Calculation:    PT Charges       Row Name 12/21/23 1124             Time Calculation    PT Received On 12/21/23  -ARGELIA      PT Goal Re-Cert Due Date 12/30/23  -ARGELIA         Untimed Charges    PT Eval/Re-eval Minutes 20  -ARGELIA         Total Minutes    Untimed Charges Total Minutes 20  -ARGELIA       Total Minutes 20  -ARGELIA                User Key  (r) = Recorded By, (t) = Taken By, (c) = Cosigned By      Initials Name Provider Type    Mark Jimenez PT Physical Therapist                  Therapy Charges for Today       Code Description Service Date Service Provider  Modifiers Qty    [id number] HC PT EVAL LOW COMPLEXITY 2 12/21/2023 Mark Walter, PT GP 1            PT G-Codes  Outcome Measure Options: AM-PAC 6 Clicks Basic Mobility (PT)  AM-PAC 6 Clicks Score (PT): 17    Makr Walter, PT  12/21/2023

## 2023-12-21 NOTE — INTERVAL H&P NOTE
H&P reviewed. The patient was examined and there are no changes to the H&P.    Electronically signed by Yousuf Cornejo MD, 12/21/23, 6:36 AM EST.

## 2023-12-21 NOTE — ANESTHESIA POSTPROCEDURE EVALUATION
Patient: Mabel Granger    Procedure Summary       Date: 12/21/23 Room / Location: [facility] OR 03 / [facility] MAIN OR    Anesthesia Start: 0708 Anesthesia Stop: 0912    Procedure: RIGHT TOTAL KNEE ARTHROPLASTY WITH A[place] ROBOT. (Right: Knee) Diagnosis:       Primary osteoarthritis of right knee      (Primary osteoarthritis of right knee [M17.11])    Surgeons: Yousuf Cornejo MD Provider: Thai Hargrove MD    Anesthesia Type: general with block ASA Status: 2            Anesthesia Type: general with block    Vitals  Vitals Value Taken Time   /65 12/21/23 0951   Temp 36.3 °C (97.4 °F) 12/21/23 0910   Pulse 80 12/21/23 0954   Resp 16 12/21/23 0945   SpO2 96 % 12/21/23 0954   Vitals shown include unfiled device data.        Post Anesthesia Care and Evaluation    Patient location during evaluation: bedside  Patient participation: complete - patient participated  Level of consciousness: awake and alert  Pain management: adequate    Airway patency: patent  Anesthetic complications: No anesthetic complications  PONV Status: none  Cardiovascular status: acceptable  Respiratory status: acceptable  Hydration status: acceptable    Comments: An Anesthesiologist personally participated in the most demanding procedures (including induction and emergence if applicable) in the anesthesia plan, monitored the course of anesthesia administration at frequent intervals and remained physically present and available for immediate diagnosis and treatment of emergencies.

## 2023-12-22 VITALS
TEMPERATURE: 97.6 F | WEIGHT: 244.71 LBS | BODY MASS INDEX: 37.09 KG/M2 | HEART RATE: 58 BPM | SYSTOLIC BLOOD PRESSURE: 121 MMHG | HEIGHT: 68 IN | DIASTOLIC BLOOD PRESSURE: 63 MMHG | RESPIRATION RATE: 16 BRPM | OXYGEN SATURATION: 95 %

## 2023-12-22 LAB
ANION GAP SERPL CALCULATED.3IONS-SCNC: 9.3 MMOL/L (ref 5–15)
BASOPHILS # BLD AUTO: 0.01 10*3/MM3 (ref 0–0.2)
BASOPHILS NFR BLD AUTO: 0.1 % (ref 0–1.5)
BUN SERPL-MCNC: 12 MG/DL (ref 8–23)
BUN/CREAT SERPL: 17.4 (ref 7–25)
CALCIUM SPEC-SCNC: 8.8 MG/DL (ref 8.6–10.5)
CHLORIDE SERPL-SCNC: 101 MMOL/L (ref 98–107)
CO2 SERPL-SCNC: 25.7 MMOL/L (ref 22–29)
CREAT SERPL-MCNC: 0.69 MG/DL (ref 0.57–1)
DEPRECATED RDW RBC AUTO: 46.8 FL (ref 37–54)
EGFRCR SERPLBLD CKD-EPI 2021: 98.3 ML/MIN/1.73
EOSINOPHIL # BLD AUTO: 0 10*3/MM3 (ref 0–0.4)
EOSINOPHIL NFR BLD AUTO: 0 % (ref 0.3–6.2)
ERYTHROCYTE [DISTWIDTH] IN BLOOD BY AUTOMATED COUNT: 13.2 % (ref 12.3–15.4)
GLUCOSE SERPL-MCNC: 138 MG/DL (ref 65–99)
HCT VFR BLD AUTO: 33.6 % (ref 34–46.6)
HGB BLD-MCNC: 10.8 G/DL (ref 12–15.9)
IMM GRANULOCYTES # BLD AUTO: 0.04 10*3/MM3 (ref 0–0.05)
IMM GRANULOCYTES NFR BLD AUTO: 0.4 % (ref 0–0.5)
LYMPHOCYTES # BLD AUTO: 1.01 10*3/MM3 (ref 0.7–3.1)
LYMPHOCYTES NFR BLD AUTO: 9.3 % (ref 19.6–45.3)
MAGNESIUM SERPL-MCNC: 2 MG/DL (ref 1.6–2.4)
MCH RBC QN AUTO: 30.9 PG (ref 26.6–33)
MCHC RBC AUTO-ENTMCNC: 32.1 G/DL (ref 31.5–35.7)
MCV RBC AUTO: 96.3 FL (ref 79–97)
MONOCYTES # BLD AUTO: 0.6 10*3/MM3 (ref 0.1–0.9)
MONOCYTES NFR BLD AUTO: 5.5 % (ref 5–12)
NEUTROPHILS NFR BLD AUTO: 84.7 % (ref 42.7–76)
NEUTROPHILS NFR BLD AUTO: 9.25 10*3/MM3 (ref 1.7–7)
NRBC BLD AUTO-RTO: 0 /100 WBC (ref 0–0.2)
PHOSPHATE SERPL-MCNC: 3.4 MG/DL (ref 2.5–4.5)
PLATELET # BLD AUTO: 250 10*3/MM3 (ref 140–450)
PMV BLD AUTO: 9.9 FL (ref 6–12)
POTASSIUM SERPL-SCNC: 4.3 MMOL/L (ref 3.5–5.2)
RBC # BLD AUTO: 3.49 10*6/MM3 (ref 3.77–5.28)
SODIUM SERPL-SCNC: 136 MMOL/L (ref 136–145)
WBC NRBC COR # BLD AUTO: 10.91 10*3/MM3 (ref 3.4–10.8)

## 2023-12-22 PROCEDURE — 94799 UNLISTED PULMONARY SVC/PX: CPT

## 2023-12-22 PROCEDURE — 97535 SELF CARE MNGMENT TRAINING: CPT

## 2023-12-22 PROCEDURE — 83735 ASSAY OF MAGNESIUM: CPT | Performed by: STUDENT IN AN ORGANIZED HEALTH CARE EDUCATION/TRAINING PROGRAM

## 2023-12-22 PROCEDURE — 85025 COMPLETE CBC W/AUTO DIFF WBC: CPT | Performed by: STUDENT IN AN ORGANIZED HEALTH CARE EDUCATION/TRAINING PROGRAM

## 2023-12-22 PROCEDURE — 99024 POSTOP FOLLOW-UP VISIT: CPT | Performed by: STUDENT IN AN ORGANIZED HEALTH CARE EDUCATION/TRAINING PROGRAM

## 2023-12-22 PROCEDURE — 80048 BASIC METABOLIC PNL TOTAL CA: CPT | Performed by: STUDENT IN AN ORGANIZED HEALTH CARE EDUCATION/TRAINING PROGRAM

## 2023-12-22 PROCEDURE — 97530 THERAPEUTIC ACTIVITIES: CPT

## 2023-12-22 PROCEDURE — 97116 GAIT TRAINING THERAPY: CPT

## 2023-12-22 PROCEDURE — 99214 OFFICE O/P EST MOD 30 MIN: CPT | Performed by: STUDENT IN AN ORGANIZED HEALTH CARE EDUCATION/TRAINING PROGRAM

## 2023-12-22 PROCEDURE — 97150 GROUP THERAPEUTIC PROCEDURES: CPT

## 2023-12-22 PROCEDURE — 97166 OT EVAL MOD COMPLEX 45 MIN: CPT

## 2023-12-22 PROCEDURE — 84100 ASSAY OF PHOSPHORUS: CPT | Performed by: STUDENT IN AN ORGANIZED HEALTH CARE EDUCATION/TRAINING PROGRAM

## 2023-12-22 RX ORDER — OXYCODONE HYDROCHLORIDE 5 MG/1
5 TABLET ORAL EVERY 4 HOURS PRN
Qty: 30 TABLET | Refills: 0 | Status: SHIPPED | OUTPATIENT
Start: 2023-12-22 | End: 2023-12-26 | Stop reason: SDUPTHER

## 2023-12-22 RX ORDER — ACETAMINOPHEN 500 MG
1000 TABLET ORAL EVERY 8 HOURS
Qty: 60 TABLET | Refills: 0 | Status: SHIPPED | OUTPATIENT
Start: 2023-12-22 | End: 2024-01-01

## 2023-12-22 RX ORDER — AMOXICILLIN 250 MG
2 CAPSULE ORAL 2 TIMES DAILY
Qty: 20 TABLET | Refills: 0 | Status: SHIPPED | OUTPATIENT
Start: 2023-12-22

## 2023-12-22 RX ADMIN — DOCUSATE SODIUM 50MG AND SENNOSIDES 8.6MG 2 TABLET: 8.6; 5 TABLET, FILM COATED ORAL at 09:44

## 2023-12-22 RX ADMIN — OXYCODONE 10 MG: 5 TABLET ORAL at 09:44

## 2023-12-22 RX ADMIN — OXYCODONE HYDROCHLORIDE 5 MG: 5 TABLET ORAL at 15:05

## 2023-12-22 RX ADMIN — OXYCODONE 10 MG: 5 TABLET ORAL at 03:58

## 2023-12-22 RX ADMIN — FERROUS SULFATE TAB 325 MG (65 MG ELEMENTAL FE) 325 MG: 325 (65 FE) TAB at 09:44

## 2023-12-22 RX ADMIN — APIXABAN 2.5 MG: 2.5 TABLET, FILM COATED ORAL at 09:44

## 2023-12-22 RX ADMIN — ACETAMINOPHEN 1000 MG: 500 TABLET ORAL at 15:05

## 2023-12-22 RX ADMIN — FAMOTIDINE 40 MG: 20 TABLET, FILM COATED ORAL at 09:44

## 2023-12-22 NOTE — SIGNIFICANT NOTE
12/22/23 0736   Plan   Final Discharge Disposition Code 01 - home or self-care   Final Note Home with Outpatient Therapy at [facility] in [place]. Appt: 12/26/23 at 9:15AM.

## 2023-12-22 NOTE — PLAN OF CARE
Goal Outcome Evaluation:  Plan of Care Reviewed With: patient        Progress: no change  Outcome Evaluation: Patient presents with limitations of decreased functional strength, balance, endurance and limited safety/insight into own deficits requiring need for skilled OT services to facilitate return to prior level of function with ADLs.      Anticipated Discharge Disposition (OT): home with outpatient therapy services

## 2023-12-22 NOTE — THERAPY TREATMENT NOTE
Acute Care - Physical Therapy Therapy Daily Note   [place]     Patient Name: Mabel Granger  : 1960  MRN: [id number]  Today's Date: 2023      Visit Dx:     ICD-10-CM ICD-9-CM   1. Difficulty in walking  R26.2 719.7   2. Primary osteoarthritis of right knee  M17.11 715.16     Patient Active Problem List   Diagnosis    Atypical ductal hyperplasia of breast    Class 1 obesity    Snoring    UARS (upper airway resistance syndrome)    Degenerative arthritis of right knee     Past Medical History:   Diagnosis Date    Arthritis     KNEE    Breast atypical hyperplasia     LEFT LUMPECTOMY    DVT (deep venous thrombosis)     UPPER EXTREMITY    Hypertension     TKAES MED AS NEEDED    PONV (postoperative nausea and vomiting)     Rash     PT  CURRENTLY ON MED FOR  YEAST INFECTION  UNDERNEATH BREAST-DR. SIMMONS ([facility]) NOTIFIED     Past Surgical History:   Procedure Laterality Date    BREAST BIOPSY      BREAST LUMPECTOMY Left 2019    Procedure: left breast needle localized lumpectomy for atypical duct hyperplasia.;  Surgeon: Nya Quach MD;  Location: [facility];  Service: General    BUNIONECTOMY      HYSTERECTOMY      MAMMO STEREOTACTIC BREAST BIOPSY 1ST W WO DEVICE      TOTAL KNEE ARTHROPLASTY Right 2023    Procedure: RIGHT TOTAL KNEE ARTHROPLASTY WITH [place]RI[place] ROBOT.;  Surgeon: Yousuf Simmons MD;  Location: [facility];  Service: Robotics - Ortho;  Laterality: Right;    TUBAL ABDOMINAL LIGATION       PT Assessment (last 12 hours)       PT Evaluation and Treatment       Row Name 23 1200          Physical Therapy Time and Intention    Subjective Information complains of;pain;dizziness  -VK     Document Type therapy note (daily note)  -VK     Mode of Treatment group therapy;physical therapy  Group therapy with 3 participants.  -VK     Patient Effort good  -VK     Comment Pt reports dizziness after sit-stand t/fs  -VK       Row Name 23 1200          General Information     Patient Profile Reviewed yes  -VK       Row Name 12/22/23 1200          Pain    Pain Intervention(s) Cold pack  -VK       Row Name 12/22/23 1200          Cognition    Affect/Mental Status (Cognition) WNL  -VK     Personal Safety Interventions fall prevention program maintained;gait belt;nonskid shoes/slippers when out of bed  -VK       Row Name 12/22/23 1200          Range of Motion (ROM)    Range of Motion right lower extremity;other (see comments)  95 degrees knee flexion  -VK       Row Name 12/22/23 1200          Mobility    Extremity Weight-bearing Status right lower extremity  -VK     Right Lower Extremity (Weight-bearing Status) weight-bearing as tolerated (WBAT)  -VK       Row Name 12/22/23 1200          Sit-Stand Transfer    Sit-Stand [place] (Transfers) contact guard  -VK     Assistive Device (Sit-Stand Transfers) walker, front-wheeled  -VK       Row Name 12/22/23 1200          Stand-Sit Transfer    Stand-Sit [place] (Transfers) contact guard  -VK     Assistive Device (Stand-Sit Transfers) walker, front-wheeled  -VK       Row Name 12/22/23 1200          Gait/Stairs (Locomotion)    Gait/Stairs Locomotion gait/ambulation assistive device  -VK     [place]ide Level (Gait) contact guard;1 person assist  -VK     Assistive Device (Gait) walker, front-wheeled  -VK     Patient was able to Ambulate yes  -VK     Distance in Feet (Gait) [id number]  -VK     Pattern (Gait) step-to  -VK     Deviations/Abnormal Patterns (Gait) antalgic;stride length decreased;gait speed decreased;carmen decreased  -VK     Bilateral Gait Deviations forward flexed posture  -VK     Right Sided Gait Deviations weight shift ability decreased;heel strike decreased  -VK       Row Name             Wound 12/21/23 0730 Right anterior knee Incision    Wound - Properties Group Placement Date: 12/21/23  -BW Placement Time: 0730  -BW Present on Original Admission: N  -BW Side: Right  -BW Orientation: anterior  -BW Location: knee  -BW Primary  Wound Type: Incision  -BW    Retired Wound - Properties Group Placement Date: 12/21/23  -BW Placement Time: 0730  -BW Present on Original Admission: N  -BW Side: Right  -BW Orientation: anterior  -BW Location: knee  -BW Primary Wound Type: Incision  -BW    Retired Wound - Properties Group Date first assessed: 12/21/23  -BW Time first assessed: 0730  -BW Present on Original Admission: N  -BW Side: Right  -BW Location: knee  -BW Primary Wound Type: Incision  -BW      Row Name             Wound 12/21/23 0735 Right proximal leg Incision    Wound - Properties Group Placement Date: 12/21/23  -BW Placement Time: 0735  -BW Present on Original Admission: N  -BW Side: Right  -BW Orientation: proximal  -BW Location: leg  -BW Primary Wound Type: Incision  -BW    Retired Wound - Properties Group Placement Date: 12/21/23  -BW Placement Time: 0735  -BW Present on Original Admission: N  -BW Side: Right  -BW Orientation: proximal  -BW Location: leg  -BW Primary Wound Type: Incision  -BW    Retired Wound - Properties Group Date first assessed: 12/21/23  -BW Time first assessed: 0735  -BW Present on Original Admission: N  -BW Side: Right  -BW Location: leg  -BW Primary Wound Type: Incision  -BW      Row Name 12/22/23 1200          Positioning and Restraints    Pre-Treatment Position sitting in chair/recliner  -VK     Post Treatment Position chair  -VK     In Chair reclined;call light within reach;encouraged to call for assist;exit alarm on  -VK       Row Name 12/22/23 1200          Progress Summary (PT)    Progress Toward Functional Goals (PT) progress toward functional goals is good  -VK     Daily Progress Summary (PT) Pt agreeable to treatment this date. Pt participated in group PT this date. Pt continues to benefit from skilled PT to address stated deficits.  -VK               User Key  (r) = Recorded By, (t) = Taken By, (c) = Cosigned By      Initials Name Provider Type    Donell Rodarte, RN Registered Nurse    LO Howard  ZHENG Green Physical Therapist Assistant                    Physical Therapy Education       Title: PT OT SLP Therapies (Done)       Topic: Physical Therapy (Done)       Point: Mobility training (Done)       Learning Progress Summary             Patient Eager, E, VU by EG at 12/22/2023 0831    Comment: Weightbearing as tolerated  Need for staff assistance when up for ADLs and transfers  Safe transfer techniques  Safe positioning for ADLs  Adaptive techniques for lower body ADLs  ADL safety and home equipment recommendations    Acceptance, E,TB, VU by CB at 12/21/2023 1954    Acceptance, E,TB, VU by ARGELIA at 12/21/2023 1126                         Point: Precautions (Done)       Learning Progress Summary             Patient Eager, E, VU by EG at 12/22/2023 0831    Comment: Weightbearing as tolerated  Need for staff assistance when up for ADLs and transfers  Safe transfer techniques  Safe positioning for ADLs  Adaptive techniques for lower body ADLs  ADL safety and home equipment recommendations    Acceptance, E,TB, VU by CB at 12/21/2023 1954    Acceptance, E,TB, VU by ARGELIA at 12/21/2023 1126                                         User Key       Initials Effective Dates Name Provider Type Discipline    CB 06/16/21 -  Yareli Fisher, RN Registered Nurse Nurse    ARGELIA 06/03/21 -  Mark Walter, PT Physical Therapist PT    EG 09/14/22 -  Bri Balderrama OT Occupational Therapist OT                  PT Recommendation and Plan     Progress Summary (PT)  Progress Toward Functional Goals (PT): progress toward functional goals is good  Daily Progress Summary (PT): Pt agreeable to treatment this date. Pt participated in group PT this date. Pt continues to benefit from skilled PT to address stated deficits.   Outcome Measures       Row Name 12/22/23 1400 12/21/23 1100          How much help from another person do you currently need...    Turning from your back to your side while in flat bed without using bedrails? 4  -VK  3  -ARGELIA     Moving from lying on back to sitting on the side of a flat bed without bedrails? 4  -VK 3  -ARGELIA     Moving to and from a bed to a chair (including a wheelchair)? 3  -VK 3  -ARGELIA     Standing up from a chair using your arms (e.g., wheelchair, bedside chair)? 3  -VK 3  -ARGELIA     Climbing 3-5 steps with a railing? 3  -VK 2  -ARGELIA     To walk in hospital room? 3  -VK 3  -ARGELIA     AM-PAC 6 Clicks Score (PT) 20  -VK 17  -ARGELIA     Highest Level of Mobility Goal 6 --> Walk 10 steps or more  -VK 5 --> Static standing  -ARGELIA        Functional Assessment    Outcome Measure Options -- AM-PAC 6 Clicks Basic Mobility (PT)  -ARGELIA               User Key  (r) = Recorded By, (t) = Taken By, (c) = Cosigned By      Initials Name Provider Type    Mark Jimenez, PT Physical Therapist    Norma Campuzano PTA Physical Therapist Assistant                Right Lower Extremity   Exercise  Reps  Sets    Short arc quads   10 2   Heel slides  10 2   Ankle pumps  10 2   Quad sets  10 2   Straight leg raise  10 2   Glute Sets 10 2   LAQ AAROM 10 2          Time Calculation:    PT Charges       Row Name 12/22/23 1249             Time Calculation    PT Received On 12/22/23  -VK         Timed Charges    82766 - Gait Training Minutes  10  -VK         Untimed Charges    PT Group Therapy Minutes 30  -VK         Total Minutes    Timed Charges Total Minutes 10  -VK      Untimed Charges Total Minutes 30  -VK       Total Minutes 40  -VK                User Key  (r) = Recorded By, (t) = Taken By, (c) = Cosigned By      Initials Name Provider Type    Norma Campuzano PTA Physical Therapist Assistant                  Therapy Charges for Today       Code Description Service Date Service Provider Modifiers Qty    26156044184 HC GAIT TRAINING EA 15 MIN 12/22/2023 Norma Howard PTA GP 1    63918266048 HC PT THER PROC GROUP 12/22/2023 Norma Howard PTA GP 1            PT G-Codes  Outcome Measure Options: AM-PAC 6 Clicks Daily Activity (OT), Optimal  Instrument  AM-PAC 6 Clicks Score (PT): 20  AM-PAC 6 Clicks Score (OT): 21    Norma Howard, PTA  12/22/2023

## 2023-12-22 NOTE — THERAPY EVALUATION
Patient Name: Mabel Granger  : 1960    MRN: [id number]                              Today's Date: 2023       Admit Date: 2023    Visit Dx:     ICD-10-CM ICD-9-CM   1. Difficulty in walking  R26.2 719.7   2. Primary osteoarthritis of right knee  M17.11 715.16     Patient Active Problem List   Diagnosis    Atypical ductal hyperplasia of breast    Class 1 obesity    Snoring    UARS (upper airway resistance syndrome)    Degenerative arthritis of right knee     Past Medical History:   Diagnosis Date    Arthritis     KNEE    Breast atypical hyperplasia     LEFT LUMPECTOMY    DVT (deep venous thrombosis)     UPPER EXTREMITY    Hypertension     TKAES MED AS NEEDED    PONV (postoperative nausea and vomiting)     Rash     PT  CURRENTLY ON MED FOR  YEAST INFECTION  UNDERNEATH BREAST-DR. SIMMONS ([facility]) NOTIFIED     Past Surgical History:   Procedure Laterality Date    BREAST BIOPSY      BREAST LUMPECTOMY Left 2019    Procedure: left breast needle localized lumpectomy for atypical duct hyperplasia.;  Surgeon: Nya Quach MD;  Location: [facility];  Service: General    BUNIONECTOMY      HYSTERECTOMY      MAMMO STEREOTACTIC BREAST BIOPSY 1ST W WO DEVICE      TOTAL KNEE ARTHROPLASTY Right 2023    Procedure: RIGHT TOTAL KNEE ARTHROPLASTY WITH [place] ROBOT.;  Surgeon: Yousuf Simmons MD;  Location: [facility];  Service: Robotics - Ortho;  Laterality: Right;    TUBAL ABDOMINAL LIGATION        General Information       Row Name 23 0832 23       OT Time and Intention    Document Type therapy note (daily note)  -EG evaluation  -EG    Mode of Treatment individual therapy;occupational therapy  -EG individual therapy;occupational therapy  -EG      Row Name 23 0832 23       General Information    Patient Profile Reviewed -- yes  Patient reports that she was not using an AD prior at home; tub shower with no seat; stands to groom and bathe; no home O2 prior;  "has ordered a RW for use at home.  -EG    Prior Level of Function -- independent:;ADL's;all household mobility;community mobility  -EG    Existing Precautions/Restrictions fall;weight bearing  -EG fall;weight bearing  -EG    Barriers to Rehab -- none identified  -EG      Row Name 12/22/23 0821          Occupational Profile    Reason for Services/Referral (Occupational Profile) Patient is a 63-year-old female admitted to [facility] on 12/21/23.  OT was consulted due to Total Right knee replacement. OT to assess for new onset of deficits and limitations in ADL/Transfers.  No previous OT services for current condition.  -EG     Patient Goals (Occupational Profile) \"Walk without pain\"  -EG       Row Name 12/22/23 0821          Living Environment    People in Home alone  reports that daughter will be coming to stay with her for a week or two  -EG       Row Name 12/22/23 0821          Home Main Entrance    Number of Stairs, Main Entrance eight  3 with a landing and then 5; no rails per patient report  -EG     Stair Railings, Main Entrance none  -EG       Row Name 12/22/23 0832 12/22/23 0821       Cognition    Orientation Status (Cognition) oriented x 3  -EG oriented x 3  -EG      Row Name 12/22/23 0832 12/22/23 0821       Safety Issues, Functional Mobility    Impairments Affecting Function (Mobility) balance;pain;range of motion (ROM);strength;endurance/activity tolerance  -EG balance;pain;range of motion (ROM);strength;endurance/activity tolerance  -EG              User Key  (r) = Recorded By, (t) = Taken By, (c) = Cosigned By      Initials Name Provider Type    EG Bri Balderrama, OT Occupational Therapist                     Mobility/ADL's       Row Name 12/22/23 0825          Bed Mobility    Bed Mobility bed mobility (all) activities  -EG     All Activities, [place] (Bed Mobility) contact guard;verbal cues  -EG     Assistive Device (Bed Mobility) bed rails  -EG       Row Name 12/22/23 0833 12/22/23 " 0825       Transfers    Transfers bed-chair transfer;sit-stand transfer;toilet transfer;stand-sit transfer  -EG --    Comment, (Transfers) -- CGA for all transfers with use of RW  -EG      Row Name 12/22/23 0833          Bed-Chair Transfer    Bed-Chair [place] (Transfers) contact guard  -EG     Assistive Device (Bed-Chair Transfers) walker, front-wheeled  -EG       Row Name 12/22/23 0833          Sit-Stand Transfer    Sit-Stand [place] (Transfers) contact guard  -EG     Assistive Device (Sit-Stand Transfers) walker, front-wheeled  -EG       Row Name 12/22/23 0833          Stand-Sit Transfer    Stand-Sit [place] (Transfers) contact guard  -EG     Assistive Device (Stand-Sit Transfers) walker, front-wheeled  -EG       Row Name 12/22/23 0833          Toilet Transfer    Type (Toilet Transfer) stand pivot/stand step  -EG     [place] Level (Toilet Transfer) contact guard  -EG     Assistive Device (Toilet Transfer) grab bars/safety frame;commode, 3-in-1  -EG       Row Name 12/22/23 0833 12/22/23 0825       Functional Mobility    Functional Mobility- Ind. Level contact guard assist;verbal cues required  -EG contact guard assist;verbal cues required  -EG    Functional Mobility- Comment Patient performed functional mobility in room using RW to and from bathroom and sink; no LOB during performance; reports increased pain; OT educated patient on safety with RW  -EG Patient performed funcitonal mobility in room using RW to and from bathroom and sink no LOB; reports increased pain; education on proper use of AD required  -EG      Row Name 12/22/23 0833 12/22/23 0825       Activities of Daily Living    BADL Assessment/Intervention bathing;upper body dressing;lower body dressing;grooming;toileting  -EG bathing;upper body dressing;lower body dressing;grooming;toileting;feeding  -EG      Row Name 12/22/23 0833 12/22/23 0825       Mobility    Extremity Weight-bearing Status right lower extremity  -EG right lower  extremity  -EG    Right Lower Extremity (Weight-bearing Status) weight-bearing as tolerated (WBAT)  -EG weight-bearing as tolerated (WBAT)  -EG      Row Name 12/22/23 0833 12/22/23 0825       Bathing Assessment/Intervention    [place] Level (Bathing) bathing skills;upper body;set up;lower body;minimum assist (75% patient effort)  -EG bathing skills;upper body;set up;lower body;minimum assist (75% patient effort)  -EG      Row Name 12/22/23 0833 12/22/23 0825       Upper Body Dressing Assessment/Training    [place] Level (Upper Body Dressing) upper body dressing skills;set up  -EG upper body dressing skills;set up  -EG      Row Name 12/22/23 0833 12/22/23 0825       Lower Body Dressing Assessment/Training    [place] Level (Lower Body Dressing) lower body dressing skills;minimum assist (75% patient effort)  -EG lower body dressing skills;minimum assist (75% patient effort)  -EG    Comment, (Lower Body Dressing) -- [place] for sock and shoe on RLE; good follow through on use of compensatory techniques to don pants  -EG      Row Name 12/22/23 0833 12/22/23 0825       Grooming Assessment/Training    [place] Level (Grooming) grooming skills;set up  -EG grooming skills;set up  -EG      Row Name 12/22/23 0833 12/22/23 0825       Toileting Assessment/Training    [place] Level (Toileting) toileting skills;contact guard assist  -EG toileting skills;contact guard assist  -EG      Row Name 12/22/23 0825          Self-Feeding Assessment/Training    [place] Level (Feeding) feeding skills;set up  -EG               User Key  (r) = Recorded By, (t) = Taken By, (c) = Cosigned By      Initials Name Provider Type    EG Bri Balderrama OT Occupational Therapist                   Obj/Interventions       Row Name 12/22/23 0834 12/22/23 0829       Sensory Assessment (Somatosensory)    Sensory Assessment (Somatosensory) sensation intact  -EG sensation intact  -EG      Row Name 12/22/23 0834 12/22/23 0829        Vision Assessment/Intervention    Visual Impairment/Limitations WFL;corrective lenses full-time  -EG WFL;corrective lenses full-time  -EG      Row Name 12/22/23 0829          Range of Motion Comprehensive    General Range of Motion bilateral upper extremity ROM WNL  -EG       Row Name 12/22/23 0829          Strength Comprehensive (MMT)    Comment, General Manual Muscle Testing (MMT) Assessment 4-/5 BUE's grossly  -EG       Row Name 12/22/23 0834 12/22/23 0829       Motor Skills    Motor Skills functional endurance;coordination  -EG coordination;functional endurance  -EG    Coordination -- WFL  -EG    Functional Endurance fair- on room air  -EG fair- on room air  -EG      Row Name 12/22/23 0834 12/22/23 0829       Balance    Balance Assessment -- sit to stand dynamic balance;standing static balance  -EG    Sit to Stand Dynamic Balance -- contact guard  -EG    Static Standing Balance -- contact guard  -EG    Position/Device Used, Standing Balance -- walker, rolling  -EG    Balance Interventions standing;sit to stand;occupation based/functional task;weight shifting activity;supported;static  -EG --              User Key  (r) = Recorded By, (t) = Taken By, (c) = Cosigned By      Initials Name Provider Type    EG Bri Balderrama OT Occupational Therapist                   Goals/Plan       Row Name 12/22/23 0830          Bed Mobility Goal 1 (OT)    Activity/Assistive Device (Bed Mobility Goal 1, OT) bed mobility activities, all  -EG     [place] Level/Cues Needed (Bed Mobility Goal 1, OT) modified independence  -EG     Time Frame (Bed Mobility Goal 1, OT) long term goal (LTG);10 days  -EG       Row Name 12/22/23 0830          Transfer Goal 1 (OT)    Activity/Assistive Device (Transfer Goal 1, OT) transfers, all  -EG     [place] Level/Cues Needed (Transfer Goal 1, OT) modified independence  -EG     Time Frame (Transfer Goal 1, OT) long term goal (LTG);10 days  -EG       Row Name 12/22/23 0830          Bathing  Goal 1 (OT)    Activity/Device (Bathing Goal 1, OT) bathing skills, all  -EG     [place] Level/Cues Needed (Bathing Goal 1, OT) modified independence  -EG     Time Frame (Bathing Goal 1, OT) long term goal (LTG);10 days  -EG       Row Name 12/22/23 0830          Dressing Goal 1 (OT)    Activity/Device (Dressing Goal 1, OT) dressing skills, all  -EG     [place]/Cues Needed (Dressing Goal 1, OT) modified independence  -EG     Time Frame (Dressing Goal 1, OT) long term goal (LTG);10 days  -EG       Row Name 12/22/23 0830          Toileting Goal 1 (OT)    Activity/Device (Toileting Goal 1, OT) toileting skills, all  -EG     [place] Level/Cues Needed (Toileting Goal 1, OT) modified independence  -EG     Time Frame (Toileting Goal 1, OT) long term goal (LTG);10 days  -EG       Row Name 12/22/23 0830          Problem Specific Goal 1 (OT)    Problem Specific Goal 1 (OT) Patient will demonstrate fair+ functional endurance skills during ADL's to return home at [facility] with ADL/Transfers safely.  -EG     Time Frame (Problem Specific Goal 1, OT) long term goal (LTG);10 days  -EG       Row Name 12/22/23 0830          Therapy Assessment/Plan (OT)    Planned Therapy Interventions (OT) activity tolerance training;functional balance retraining;occupation/activity based interventions;adaptive equipment training;BADL retraining;neuromuscular control/coordination retraining;patient/caregiver education/training;transfer/mobility retraining;strengthening exercise  -EG               User Key  (r) = Recorded By, (t) = Taken By, (c) = Cosigned By      Initials Name Provider Type    EG Bri Balderrama, RIAZ Occupational Therapist                   Clinical Impression       Row Name 12/22/23 0835 12/22/23 0829       Pain Assessment    Pretreatment Pain Rating 6/10  -EG 6/10  -EG    Posttreatment Pain Rating 8/10  -EG 8/10  -EG    Pain Location - Side/Orientation Right  -EG Right  -EG    Pain Location lower  -EG lower  -EG    Pain  Location - -- knee  -EG      Row Name 12/22/23 0835 12/22/23 0829       Plan of Care Review    Plan of Care Reviewed With patient  -EG patient  -EG    Progress no change  -EG no change  -EG    Outcome Evaluation Patient is pleasant and cooperative; Reports increased pain with WB and functional task performance; Overall displays need for skilled OT Services to address deficits in performance areas and improve ADL/Transfer ind. skills.; Continue POC, Ax1 w/RW  -EG Patient presents with limitations of decreased functional strength, balance, endurance and limited safety/insight into own deficits requiring need for skilled OT services to facilitate return to prior level of function with ADLs.  -EG      Row Name 12/22/23 0835 12/22/23 0829       Therapy Assessment/Plan (OT)    Rehab Potential (OT) good, to achieve stated therapy goals  -EG good, to achieve stated therapy goals  -EG    Criteria for Skilled Therapeutic Interventions Met (OT) skilled treatment is necessary;yes;meets criteria  -EG skilled treatment is necessary;yes;meets criteria  -EG    Therapy Frequency (OT) 5 times/wk  -EG 5 times/wk  -EG      Row Name 12/22/23 0835 12/22/23 0829       Therapy Plan Review/Discharge Plan (OT)    Equipment Needs Upon Discharge (OT) -- --  Rolling walker; could benefit from 3-in-1 commode  -EG    Anticipated Discharge Disposition (OT) home with outpatient therapy services  -EG home with outpatient therapy services  -EG      Row Name 12/22/23 0835 12/22/23 0829       Positioning and Restraints    Pre-Treatment Position sitting in chair/recliner  -EG sitting in chair/recliner  -EG    Post Treatment Position chair  -EG chair  -EG    In Chair reclined;call light within reach;exit alarm on  -EG reclined;call light within reach;exit alarm on  -EG              User Key  (r) = Recorded By, (t) = Taken By, (c) = Cosigned By      Initials Name Provider Type    EG Bri Baledrrama, OT Occupational Therapist                   Outcome  Measures       Row Name 12/22/23 0836 12/22/23 0831       How much help from another is currently needed...    Putting on and taking off regular lower body clothing? 3  -EG 3  -EG    Bathing (including washing, rinsing, and drying) 3  -EG 3  -EG    Toileting (which includes using toilet bed pan or urinal) 3  -EG 3  -EG    Putting on and taking off regular upper body clothing 4  -EG 4  -EG    Taking care of personal grooming (such as brushing teeth) 4  -EG 4  -EG    Eating meals 4  -EG 4  -EG    AM-PAC 6 Clicks Score (OT) 21  -EG 21  -EG      Row Name 12/22/23 0836 12/22/23 0831       Functional Assessment    Outcome Measure Options AM-PAC 6 Clicks Daily Activity (OT);Optimal Instrument  -EG AM-PAC 6 Clicks Daily Activity (OT);Optimal Instrument  -EG      Row Name 12/22/23 0836 12/22/23 0831       Optimal Instrument    Optimal Instrument Optimal - 3  -EG Optimal - 3  -EG    Bending/Stooping 2  -EG 2  -EG    Standing 2  -EG 2  -EG    Reaching 1  -EG 1  -EG    From the list, choose the 3 activities you would most like to be able to do without any difficulty -- Standing;Reaching;Bending/stooping  -EG    Total Score Optimal - 3 -- 5  -EG              User Key  (r) = Recorded By, (t) = Taken By, (c) = Cosigned By      Initials Name Provider Type    Bri Qiu OT Occupational Therapist                    Occupational Therapy Education       Title: PT OT SLP Therapies (Done)       Topic: Occupational Therapy (Done)       Point: ADL training (Done)       Description:   Instruct learner(s) on proper safety adaptation and remediation techniques during self care or transfers.   Instruct in proper use of assistive devices.                  Learning Progress Summary             Patient Donnaer E, VU by EG at 12/22/2023 0831    Comment: Weightbearing as tolerated  Need for staff assistance when up for ADLs and transfers  Safe transfer techniques  Safe positioning for ADLs  Adaptive techniques for lower body ADLs  ADL  safety and home equipment recommendations                         Point: Home exercise program (Done)       Description:   Instruct learner(s) on appropriate technique for monitoring, assisting and/or progressing therapeutic exercises/activities.                  Learning Progress Summary             Patient VANI Roberts VU by ASCENCION at 12/22/2023 0831    Comment: Weightbearing as tolerated  Need for staff assistance when up for ADLs and transfers  Safe transfer techniques  Safe positioning for ADLs  Adaptive techniques for lower body ADLs  ADL safety and home equipment recommendations                         Point: Precautions (Done)       Description:   Instruct learner(s) on prescribed precautions during self-care and functional transfers.                  Learning Progress Summary             Patient VANI Roberts VU by ASCENCION at 12/22/2023 0831    Comment: Weightbearing as tolerated  Need for staff assistance when up for ADLs and transfers  Safe transfer techniques  Safe positioning for ADLs  Adaptive techniques for lower body ADLs  ADL safety and home equipment recommendations                         Point: Body mechanics (Done)       Description:   Instruct learner(s) on proper positioning and spine alignment during self-care, functional mobility activities and/or exercises.                  Learning Progress Summary             Patient VANI Roberts VU by ASCENCION at 12/22/2023 0831    Comment: Weightbearing as tolerated  Need for staff assistance when up for ADLs and transfers  Safe transfer techniques  Safe positioning for ADLs  Adaptive techniques for lower body ADLs  ADL safety and home equipment recommendations                                         User Key       Initials Effective Dates Name Provider Type Discipline    EG 09/14/22 -  Bri Balderrama OT Occupational Therapist OT                  OT Recommendation and Plan  Planned Therapy Interventions (OT): activity tolerance training, functional balance retraining,  occupation/activity based interventions, adaptive equipment training, BADL retraining, neuromuscular control/coordination retraining, patient/caregiver education/training, transfer/mobility retraining, strengthening exercise  Therapy Frequency (OT): 5 times/wk  Plan of Care Review  Plan of Care Reviewed With: patient  Progress: no change  Outcome Evaluation: Patient is pleasant and cooperative; Reports increased pain with WB and functional task performance; Overall displays need for skilled OT Services to address deficits in performance areas and improve ADL/Transfer ind. skills.; Continue POC, Ax1 w/RW     Time Calculation:   Evaluation Complexity (OT)  Review Occupational Profile/Medical/Therapy History Complexity: expanded/moderate complexity  Assessment, Occupational Performance/Identification of Deficit Complexity: 3-5 performance deficits  Clinical Decision Making Complexity (OT): detailed assessment/moderate complexity  Overall Complexity of Evaluation (OT): moderate complexity     Time Calculation- OT       Row Name 12/22/23 0836 12/22/23 0832          Time Calculation- OT    OT Received On 12/22/23  -EG 12/22/23  -EG     OT Goal Re-Cert Due Date 12/31/23  -EG 12/31/23  -EG        Timed Charges    63515 - OT Therapeutic Activity Minutes 15  -EG --     97800 - OT Self Care/Mgmt Minutes 29  -EG --        Untimed Charges    OT Eval/Re-eval Minutes -- 32  -EG        SNF Occupational Therapy Minutes    Skilled Minutes- OT 44 min  -EG --        Total Minutes    Timed Charges Total Minutes 44  -EG --     Untimed Charges Total Minutes -- 32  -EG      Total Minutes 44  -EG 32  -EG               User Key  (r) = Recorded By, (t) = Taken By, (c) = Cosigned By      Initials Name Provider Type    EG Bri Balderrama, RIAZ Occupational Therapist                  Therapy Charges for Today       Code Description Service Date Service Provider Modifiers Qty    74912615274  OT EVAL MOD COMPLEXITY 3 12/22/2023 Bri Balderrama,  OT GO 1    [id number]  OT THERAPEUTIC ACT EA 15 MIN 12/22/2023 Bri Balderrama OT GO 1    [id number] HC OT SELF CARE/MGMT/TRAIN EA 15 MIN 12/22/2023 Bri Balderrama OT GO 2                 Bri Balderrama OT  12/22/2023

## 2023-12-22 NOTE — PLAN OF CARE
Goal Outcome Evaluation:  Plan of Care Reviewed With: patient        Progress: improving  Outcome Evaluation: Pt A&O x4, VSS, c/o surgical pain, administered prn pain medication with adequate control. Pt cleared by PT to discharge home, discharge orders placed this morning. Pt's only ride was her daughter, arrived to hospital at 1630. Discharge instructions gone over with patient and daughter, patient verbalized understanding. Pt going home with daughter via personal vehicle.

## 2023-12-22 NOTE — PLAN OF CARE
Goal Outcome Evaluation:      Pt stable throughout shift, medicated for pain x2, voiding spontaneously. Possible d/c today.

## 2023-12-22 NOTE — PROGRESS NOTES
[facility]   Hospitalist Progress Note  Date: 2023  Patient Name: Mabel Granger  : 1960  MRN: [id number]  Date of admission: 2023  Room/Bed: 229/1      Subjective   Subjective     Chief Complaint: Right knee pain     Summary:  Mabel Granger is a 63 y.o. female with past medical history of hypertension who presented with complaints of right knee pain.  Patient failed medical management including joint injections with persistent pain.  She also complained of having popping sounds in the knee.  Given the conservative management of osteoarthritis of right knee failed, patient underwent Right total knee arthroplasty with Sandy robot today on 2023.  Patient tolerated the procedure well.     Medicine was consulted for medical management of chronic issues.  Patient was evaluated after the surgical procedure.  Vital signs showed patient with T36.3 HR 67, RR 16 blood pressure 112/71 and saturation 99% on 3 L/min of oxygen via NC.    Interval Followup: No acute events overnight.  Currently saturating well on room air.  Been stable overnight.  Stated pain is well-controlled with as needed pain medications.  Denies any fever, chills, rigors, chest pain or difficulty breathing.    Review of Systems    All systems reviewed and negative except for what is outlined above.      Objective   Objective     Vitals:   Temp:  [97.3 °F (36.3 °C)-98.4 °F (36.9 °C)] 98.4 °F (36.9 °C)  Heart Rate:  [51-67] 51  Resp:  [16] 16  BP: (108-123)/(48-71) 123/55  Flow (L/min):  [2-3] 2    Physical Exam   General: Awake, alert, NAD  Cardiovascular: RRR, no murmurs   Pulmonary: CTA bilaterally; no wheezes; no conversational dyspnea  Gastrointestinal: S/ND/NT, +BS  Neuro: Alert, awake, oriented x 3; speech clear; no tremor  : No Solis catheter; no suprapubic tenderness    Result Review    Result Review:  I have personally reviewed these results:  [x]  Laboratory      Lab 23  0424 23  1105   WBC 10.91*  --     HEMOGLOBIN 10.8* 12.5   HEMATOCRIT 33.6* 38.7   PLATELETS 250  --    NEUTROS ABS 9.25*  --    IMMATURE GRANS (ABS) 0.04  --    LYMPHS ABS 1.01  --    MONOS ABS 0.60  --    EOS ABS 0.00  --    MCV 96.3  --          Lab 12/22/23  0424   SODIUM 136   POTASSIUM 4.3   CHLORIDE 101   CO2 25.7   ANION GAP 9.3   BUN 12   CREATININE 0.69   EGFR 98.3   GLUCOSE 138*   CALCIUM 8.8   MAGNESIUM 2.0   PHOSPHORUS 3.4                         Brief Urine Lab Results  (Last result in the past 365 days)        Color   Clarity   Blood   Leuk Est   Nitrite   Protein   CREAT   Urine HCG        12/13/23 0825 Yellow   Clear   Negative   Negative   Negative   Negative                 [x]  Microbiology   Microbiology Results (last 10 days)       ** No results found for the last 240 hours. **          [x]  Radiology  XR Knee 1 or 2 View Right    Result Date: 12/21/2023   In the right knee prosthesis appears well aligned.  No radiographic signs of immediate postoperative complication.      MISAEL WEBB DO       Electronically Signed and Approved By: MISAEL WEBB DO on 12/21/2023 at 9:56            []  EKG/Telemetry   []  Cardiology/Vascular   []  Pathology  []  Old records  []  Other:    Assessment & Plan   Assessment / Plan     Assessment:  Primary osteoarthritis of right knee s/p right total knee arthroplasty with Sandy robot; POD day 0  Leukocytosis, likely reactive  History of hypertension  Prediabetes    Plan:  Patient is being treated in MedSurg unit, medicine was consulted for management of chronic issues.  Patient underwent right total knee arthroplasty with Sandy robot today on 12/29/2023, tolerated the procedure well.  On oxycodone as needed for pain.  On DVT prophylaxis as per orthopedic surgeon.  Patient is on losartan 25 mg daily at home as needed for blood pressure greater than 140/90.  Can restart home losartan.Patient was found to be prediabetic with HbA1c 5.7 and blood work done on 12/13.  Outpatient follow-up.  Continue  rest of the current management.  Leukocytosis, likely reactive.  No signs of infection.  Hemoglobin 10.8 today.  Likely postoperative changes after fluid resuscitation.  No active signs of infection.  Patient will follow-up with PCP in 3-7 days, needs CBC including WBC and hemoglobin trended during follow-up visit along with chemistries.  Patient stable to be discharged from medicine standpoint.  Rest of the management as per orthopedic surgeon.     Discussed with RN.    DVT prophylaxis:  Medical and mechanical DVT prophylaxis orders are present.    CODE STATUS:   Code Status (Patient has no pulse and is not breathing): CPR (Attempt to Resuscitate)  Medical Interventions (Patient has pulse or is breathing): Full Support      Electronically signed by Julio C Coto MD, 12/22/23, 10:31 AM EST.

## 2023-12-22 NOTE — PLAN OF CARE
Calm and cooperative this shift. PRN pain medications given x2 this shift, with relief noted. Voiding without difficulties. ACE wrap to right leg without any drainage. Plan of care ongoing.

## 2023-12-22 NOTE — THERAPY TREATMENT NOTE
Acute Care - Physical Therapy Treatment Note   [place]     Patient Name: Mabel Granger  : 1960  MRN: [id number]  Today's Date: 2023      Visit Dx:     ICD-10-CM ICD-9-CM   1. Difficulty in walking  R26.2 719.7   2. Primary osteoarthritis of right knee  M17.11 715.16     Patient Active Problem List   Diagnosis    Atypical ductal hyperplasia of breast    Class 1 obesity    Snoring    UARS (upper airway resistance syndrome)    Degenerative arthritis of right knee     Past Medical History:   Diagnosis Date    Arthritis     KNEE    Breast atypical hyperplasia     LEFT LUMPECTOMY    DVT (deep venous thrombosis)     UPPER EXTREMITY    Hypertension     TKAES MED AS NEEDED    PONV (postoperative nausea and vomiting)     Rash     PT  CURRENTLY ON MED FOR  YEAST INFECTION  UNDERNEATH BREAST-DR. SIMMONS ([facility]) NOTIFIED     Past Surgical History:   Procedure Laterality Date    BREAST BIOPSY      BREAST LUMPECTOMY Left 2019    Procedure: left breast needle localized lumpectomy for atypical duct hyperplasia.;  Surgeon: Nya Quach MD;  Location: [facility];  Service: General    BUNIONECTOMY      HYSTERECTOMY      MAMMO STEREOTACTIC BREAST BIOPSY 1ST W WO DEVICE      TOTAL KNEE ARTHROPLASTY Right 2023    Procedure: RIGHT TOTAL KNEE ARTHROPLASTY WITH ALEX[place]RIA ROBOT.;  Surgeon: Yousuf Simmons MD;  Location: [facility];  Service: Robotics - Ortho;  Laterality: Right;    TUBAL ABDOMINAL LIGATION       PT Assessment (last 12 hours)       PT Evaluation and Treatment       Row Name 23 1500 23 1200       Physical Therapy Time and Intention    Subjective Information complains of;pain  -VK complains of;pain;dizziness  -VK    Document Type therapy note (daily note)  -VK therapy note (daily note)  -VK    Mode of Treatment individual therapy;group therapy;physical therapy  -VK group therapy;physical therapy  Group therapy with 3 participants.  -VK    Patient Effort good  -VK good  -VK     Comment Gait training performed individually; therapeutic exercises performed in a group setting with * participants  -VK Pt reports dizziness after sit-stand t/fs  -VK      Row Name 12/22/23 1500 12/22/23 1200       General Information    Patient Profile Reviewed yes  -VK yes  -VK    Patient Observations alert;cooperative;agree to therapy  -VK --      Row Name 12/22/23 1500 12/22/23 1200       Pain    Pain Intervention(s) Cold pack  -VK Cold pack  -VK      Row Name 12/22/23 1500 12/22/23 1200       Cognition    Affect/Mental Status (Cognition) WNL  -VK WNL  -VK    Personal Safety Interventions fall prevention program maintained;gait belt;nonskid shoes/slippers when out of bed  -VK fall prevention program maintained;gait belt;nonskid shoes/slippers when out of bed  -VK      Row Name 12/22/23 1200          Range of Motion (ROM)    Range of Motion right lower extremity;other (see comments)  95 degrees knee flexion  -VK       Row Name 12/22/23 1500 12/22/23 1200       Mobility    Extremity Weight-bearing Status right lower extremity  -VK right lower extremity  -VK    Right Lower Extremity (Weight-bearing Status) weight-bearing as tolerated (WBAT)  -VK weight-bearing as tolerated (WBAT)  -VK      Row Name 12/22/23 1500          Transfers    Transfers sit-stand transfer;stand-sit transfer  -VK       Row Name 12/22/23 1500 12/22/23 1200       Sit-Stand Transfer    Sit-Stand [place] (Transfers) contact guard  -VK contact guard  -VK    Assistive Device (Sit-Stand Transfers) walker, front-wheeled  -VK walker, front-wheeled  -VK      Row Name 12/22/23 1500 12/22/23 1200       Stand-Sit Transfer    Stand-Sit [place] (Transfers) contact guard  -VK contact guard  -VK    Assistive Device (Stand-Sit Transfers) walker, front-wheeled  -VK walker, front-wheeled  -VK      Row Name 12/22/23 1500 12/22/23 1200       Gait/Stairs (Locomotion)    Gait/Stairs Locomotion gait/ambulation assistive device  -VK gait/ambulation  assistive device  -VK    [place] Level (Gait) contact guard;1 person assist  -VK contact guard;1 person assist  -VK    Assistive Device (Gait) walker, front-wheeled  -VK walker, front-wheeled  -VK    Patient was able to Ambulate yes  -VK yes  -VK    Distance in Feet (Gait) [id number]  -VK [id number]  -VK    Pattern (Gait) step-to  -VK step-to  -VK    Deviations/Abnormal Patterns (Gait) antalgic;stride length decreased;gait speed decreased;carmen decreased  -VK antalgic;stride length decreased;gait speed decreased;carmen decreased  -VK    Bilateral Gait Deviations forward flexed posture  v/c to correct  -VK forward flexed posture  -VK    Right Sided Gait Deviations weight shift ability decreased;heel strike decreased  -VK weight shift ability decreased;heel strike decreased  -VK      Row Name 12/22/23 1500          Safety Issues, Functional Mobility    Impairments Affecting Function (Mobility) balance;pain;range of motion (ROM);strength  -       Row Name 12/22/23 1500          Balance    Balance Assessment standing dynamic balance  -VK     Position/Device Used, Standing Balance walker, front-wheeled  -VK       Row Name 12/22/23 1500          Motor Skills    Therapeutic Exercise knee;hip;ankle  -       Row Name 12/22/23 1500          Hip (Therapeutic Exercise)    Hip (Therapeutic Exercise) isometric exercises  -     Hip Isometrics (Therapeutic Exercise) right;gluteal sets;10 repetitions;2 sets  -       Row Name 12/22/23 1500          Knee (Therapeutic Exercise)    Knee (Therapeutic Exercise) isometric exercises;strengthening exercise  -     Knee Isometrics (Therapeutic Exercise) right;quad sets;10 repetitions;2 sets  -     Knee Strengthening (Therapeutic Exercise) right;heel slides;SLR (straight leg raise);SAQ (short arc quad);LAQ (long arc quad);10 repetitions;2 sets  -       Row Name 12/22/23 1500          Ankle (Therapeutic Exercise)    Ankle (Therapeutic Exercise) AROM (active range of motion)  -      Ankle AROM (Therapeutic Exercise) right;dorsiflexion;plantarflexion;10 repetitions;2 sets  -VK       Row Name             Wound 12/21/23 0730 Right anterior knee Incision    Wound - Properties Group Placement Date: 12/21/23  -BW Placement Time: 0730  -BW Present on Original Admission: N  -BW Side: Right  -BW Orientation: anterior  -BW Location: knee  -BW Primary Wound Type: Incision  -BW    Retired Wound - Properties Group Placement Date: 12/21/23  -BW Placement Time: 0730  -BW Present on Original Admission: N  -BW Side: Right  -BW Orientation: anterior  -BW Location: knee  -BW Primary Wound Type: Incision  -BW    Retired Wound - Properties Group Date first assessed: 12/21/23  -BW Time first assessed: 0730  -BW Present on Original Admission: N  -BW Side: Right  -BW Location: knee  -BW Primary Wound Type: Incision  -BW      Row Name             Wound 12/21/23 0735 Right proximal leg Incision    Wound - Properties Group Placement Date: 12/21/23  -BW Placement Time: 0735  -BW Present on Original Admission: N  -BW Side: Right  -BW Orientation: proximal  -BW Location: leg  -BW Primary Wound Type: Incision  -BW    Retired Wound - Properties Group Placement Date: 12/21/23  -BW Placement Time: 0735  -BW Present on Original Admission: N  -BW Side: Right  -BW Orientation: proximal  -BW Location: leg  -BW Primary Wound Type: Incision  -BW    Retired Wound - Properties Group Date first assessed: 12/21/23  -BW Time first assessed: 0735  -BW Present on Original Admission: N  -BW Side: Right  -BW Location: leg  -BW Primary Wound Type: Incision  -BW      Row Name 12/22/23 1500 12/22/23 1200       Positioning and Restraints    Pre-Treatment Position sitting in chair/recliner  -VK sitting in chair/recliner  -VK    Post Treatment Position chair  -VK chair  -VK    In Chair reclined;exit alarm on;encouraged to call for assist;call light within reach  -VK reclined;call light within reach;encouraged to call for assist;exit alarm on   -VK      Row Name 12/22/23 1500 12/22/23 1200       Progress Summary (PT)    Progress Toward Functional Goals (PT) progress toward functional goals is good  -VK progress toward functional goals is good  -VK    Daily Progress Summary (PT) Pt agreeable to treatment. Pt is progressing well with their exercise program. Will continue current plan of care.  -VK Pt agreeable to treatment this date. Pt participated in group PT this date. Pt continues to benefit from skilled PT to address stated deficits.  -VK              User Key  (r) = Recorded By, (t) = Taken By, (c) = Cosigned By      Initials Name Provider Type    Donell Rodarte, RN Registered Nurse    Norma Campuzano PTA Physical Therapist Assistant                    Physical Therapy Education       Title: PT OT SLP Therapies (Done)       Topic: Physical Therapy (Done)       Point: Mobility training (Done)       Learning Progress Summary             Patient Eager, E, VU by ASCENCION at 12/22/2023 0831    Comment: Weightbearing as tolerated  Need for staff assistance when up for ADLs and transfers  Safe transfer techniques  Safe positioning for ADLs  Adaptive techniques for lower body ADLs  ADL safety and home equipment recommendations    Acceptance, E,TB, VU by CB at 12/21/2023 1954    Acceptance, E,TB, VU by ARGELIA at 12/21/2023 1126                         Point: Precautions (Done)       Learning Progress Summary             Patient Eager, E, VU by ASCENCION at 12/22/2023 0831    Comment: Weightbearing as tolerated  Need for staff assistance when up for ADLs and transfers  Safe transfer techniques  Safe positioning for ADLs  Adaptive techniques for lower body ADLs  ADL safety and home equipment recommendations    Acceptance, E,TB, VU by CB at 12/21/2023 1954    Acceptance, E,TB, VU by ARGELIA at 12/21/2023 1126                                         User Key       Initials Effective Dates Name Provider Type [facility]    CB 06/16/21 -  Yareli Fisher, RN Registered Nurse  Nurse    ARGELIA 06/03/21 -  Mark Walter, PT Physical Therapist PT    EG 09/14/22 -  Bri Balderrama OT Occupational Therapist OT                  PT Recommendation and Plan     Progress Summary (PT)  Progress Toward Functional Goals (PT): progress toward functional goals is good  Daily Progress Summary (PT): Pt agreeable to treatment. Pt is progressing well with their exercise program. Will continue current plan of care.   Outcome Measures       Row Name 12/22/23 1500 12/22/23 1400 12/21/23 1100       How much help from another person do you currently need...    Turning from your back to your side while in flat bed without using bedrails? 4  -VK 4  -VK 3  -ARGELIA    Moving from lying on back to sitting on the side of a flat bed without bedrails? 4  -VK 4  -VK 3  -ARGELIA    Moving to and from a bed to a chair (including a wheelchair)? 3  -VK 3  -VK 3  -ARGELIA    Standing up from a chair using your arms (e.g., wheelchair, bedside chair)? 3  -VK 3  -VK 3  -ARGELIA    Climbing 3-5 steps with a railing? 3  -VK 3  -VK 2  -ARGELIA    To walk in hospital room? 3  -VK 3  -VK 3  -ARGELIA    AM-PAC 6 Clicks Score (PT) 20  -VK 20  -VK 17  -ARGELIA    Highest Level of Mobility Goal 6 --> Walk 10 steps or more  -VK 6 --> Walk 10 steps or more  -VK 5 --> Static standing  -ARGELIA       Functional Assessment    Outcome Measure Options -- -- AM-PAC 6 Clicks Basic Mobility (PT)  -ARGELIA              User Key  (r) = Recorded By, (t) = Taken By, (c) = Cosigned By      Initials Name Provider Type    ARGELIA Mark Walter, PT Physical Therapist    Norma Campuzano PTA Physical Therapist Assistant                     Time Calculation:    PT Charges       Row Name 12/22/23 1513 12/22/23 1249          Time Calculation    PT Received On 12/22/23  -VK 12/22/23  -VK        Timed Charges    69832 - Gait Training Minutes  10  -VK 10  -VK        Untimed Charges    PT Group Therapy Minutes 30  -VK 30  -VK        Total Minutes    Timed Charges Total Minutes 10  -VK 10  -VK     Untimed  Charges Total Minutes 30  -VK 30  -VK      Total Minutes 40  -VK 40  -VK               User Key  (r) = Recorded By, (t) = Taken By, (c) = Cosigned By      Initials Name Provider Type    Norma Campuzano PTA Physical Therapist Assistant                  Therapy Charges for Today       Code Description Service Date Service Provider Modifiers Qty    71349052723 HC GAIT TRAINING EA 15 MIN 12/22/2023 Norma Howard, PTA GP 1    09595747846 HC PT THER PROC GROUP 12/22/2023 Norma Howard, hospitals GP 1    68048712825 HC GAIT TRAINING EA 15 MIN 12/22/2023 Norma Howard, PTA GP 1    48230065702 HC PT THER PROC GROUP 12/22/2023 HowardNorma rossi, hospitals GP 1            PT G-Codes  Outcome Measure Options: AM-PAC 6 Clicks Daily Activity (OT), Optimal Instrument  AM-PAC 6 Clicks Score (PT): 20  AM-PAC 6 Clicks Score (OT): 21    Norma Howard PTA  12/22/2023

## 2023-12-22 NOTE — PROGRESS NOTES
[facility]     Progress Note    Patient Name: Mabel Granger  : 1960  MRN: [id number]  Primary Care Physician:  Nayana Cadena MD  Date of admission: 2023    Subjective   Subjective     HPI:  No events overnight.  Pain well-controlled.  Denies chest pain or shortness of breath.  Up and ambulatory.  Voiding without difficulty.    Review of Systems   See HPI    Objective   Objective     Vitals:   Temp:  [97.3 °F (36.3 °C)-97.6 °F (36.4 °C)] 97.6 °F (36.4 °C)  Heart Rate:  [51-86] 63  Resp:  [16-18] 16  BP: (108-143)/(48-97) 118/48  Flow (L/min):  [2-3] 2  Physical Exam    General: Alert, no acute distress   Cardiac: Intact peripheral pulses   Pulmonary: Nonlabored respiration   Right lower extremity: Dressings in place.  No drainage noted.  Swelling is mild.  Calf nontender.  Distal neurovascular intact.  Palpable pedal pulse.    Result Review      WBC   Date Value Ref Range Status   2023 10.91 (H) 3.40 - 10.80 10*3/mm3 Final     RBC   Date Value Ref Range Status   2023 3.49 (L) 3.77 - 5.28 10*6/mm3 Final     Hemoglobin   Date Value Ref Range Status   2023 10.8 (L) 12.0 - 15.9 g/dL Final     Hematocrit   Date Value Ref Range Status   2023 33.6 (L) 34.0 - 46.6 % Final     MCV   Date Value Ref Range Status   2023 96.3 79.0 - 97.0 fL Final     MCH   Date Value Ref Range Status   2023 30.9 26.6 - 33.0 pg Final     MCHC   Date Value Ref Range Status   2023 32.1 31.5 - 35.7 g/dL Final     RDW   Date Value Ref Range Status   2023 13.2 12.3 - 15.4 % Final     RDW-SD   Date Value Ref Range Status   2023 46.8 37.0 - 54.0 fl Final     MPV   Date Value Ref Range Status   2023 9.9 6.0 - 12.0 fL Final     Platelets   Date Value Ref Range Status   2023 250 140 - 450 10*3/mm3 Final     Neutrophil %   Date Value Ref Range Status   2023 84.7 (H) 42.7 - 76.0 % Final     Lymphocyte %   Date Value Ref Range Status   2023 9.3 (L) 19.6 -  45.3 % Final     Monocyte %   Date Value Ref Range Status   12/22/2023 5.5 5.0 - 12.0 % Final     Eosinophil %   Date Value Ref Range Status   12/22/2023 0.0 (L) 0.3 - 6.2 % Final     Basophil %   Date Value Ref Range Status   12/22/2023 0.1 0.0 - 1.5 % Final     Immature Grans %   Date Value Ref Range Status   12/22/2023 0.4 0.0 - 0.5 % Final     Neutrophils, Absolute   Date Value Ref Range Status   12/22/2023 9.25 (H) 1.70 - 7.00 10*3/mm3 Final     Lymphocytes, Absolute   Date Value Ref Range Status   12/22/2023 1.01 0.70 - 3.10 10*3/mm3 Final     Monocytes, Absolute   Date Value Ref Range Status   12/22/2023 0.60 0.10 - 0.90 10*3/mm3 Final     Eosinophils, Absolute   Date Value Ref Range Status   12/22/2023 0.00 0.00 - 0.40 10*3/mm3 Final     Basophils, Absolute   Date Value Ref Range Status   12/22/2023 0.01 0.00 - 0.20 10*3/mm3 Final     Immature Grans, Absolute   Date Value Ref Range Status   12/22/2023 0.04 0.00 - 0.05 10*3/mm3 Final     nRBC   Date Value Ref Range Status   12/22/2023 0.0 0.0 - 0.2 /100 WBC Final        Result Review:  I have personally reviewed the results from the time of this admission to 12/22/2023 07:17 EST and agree with these findings:  [x]  Laboratory  []  Microbiology  [x]  Radiology  []  EKG/Telemetry   []  Cardiology/Vascular   []  Pathology  []  Old records  []  Other:      Assessment & Plan   Assessment / Plan     Brief Patient Summary:  Mabel Granger is a 63 y.o. female status post right total knee arthroplasty on 12/21    Active Hospital Problems:  Active Hospital Problems    Diagnosis     **Degenerative arthritis of right knee      Plan:   X-rays reviewed: No fractures or hardware complications  Hemoglobin 10.8-monitor  Pain control  Postoperative antibiotics  DVT prophylaxis: Eliquis, SCD, mobilize  Weight-bear as tolerated  PT/OT  Further care per primary team, appreciate assistance    Dispo: Stable for discharge from orthopedic standpoint.  2-week follow-up for  reevaluation.       DVT prophylaxis:  Medical and mechanical DVT prophylaxis orders are present.    CODE STATUS:   Code Status (Patient has no pulse and is not breathing): CPR (Attempt to Resuscitate)  Medical Interventions (Patient has pulse or is breathing): Full Support      Electronically signed by Yousuf Cornejo MD, 12/22/23, 7:17 AM EST.

## 2023-12-26 DIAGNOSIS — M17.11 PRIMARY OSTEOARTHRITIS OF RIGHT KNEE: ICD-10-CM

## 2023-12-26 RX ORDER — OXYCODONE HYDROCHLORIDE 5 MG/1
5 TABLET ORAL EVERY 4 HOURS PRN
Qty: 30 TABLET | Refills: 0 | Status: SHIPPED | OUTPATIENT
Start: 2023-12-26 | End: 2024-01-01

## 2024-01-02 ENCOUNTER — TELEPHONE (OUTPATIENT)
Dept: ORTHOPEDIC SURGERY | Facility: CLINIC | Age: 64
End: 2024-01-02
Payer: COMMERCIAL

## 2024-01-02 DIAGNOSIS — M17.11 PRIMARY OSTEOARTHRITIS OF RIGHT KNEE: ICD-10-CM

## 2024-01-02 LAB
QT INTERVAL: 426 MS
QTC INTERVAL: 386 MS

## 2024-01-02 RX ORDER — OXYCODONE HYDROCHLORIDE 5 MG/1
5 TABLET ORAL EVERY 4 HOURS PRN
Qty: 30 TABLET | Refills: 0 | Status: SHIPPED | OUTPATIENT
Start: 2024-01-02 | End: 2024-01-08 | Stop reason: SDUPTHER

## 2024-01-04 ENCOUNTER — OFFICE VISIT (OUTPATIENT)
Dept: ORTHOPEDIC SURGERY | Facility: CLINIC | Age: 64
End: 2024-01-04
Payer: COMMERCIAL

## 2024-01-04 VITALS
OXYGEN SATURATION: 95 % | BODY MASS INDEX: 35.46 KG/M2 | WEIGHT: 234 LBS | HEART RATE: 80 BPM | SYSTOLIC BLOOD PRESSURE: 118 MMHG | HEIGHT: 68 IN | DIASTOLIC BLOOD PRESSURE: 74 MMHG

## 2024-01-04 DIAGNOSIS — M25.561 RIGHT KNEE PAIN, UNSPECIFIED CHRONICITY: ICD-10-CM

## 2024-01-04 DIAGNOSIS — Z47.1 AFTERCARE FOLLOWING RIGHT KNEE JOINT REPLACEMENT SURGERY: Primary | ICD-10-CM

## 2024-01-04 DIAGNOSIS — Z96.651 AFTERCARE FOLLOWING RIGHT KNEE JOINT REPLACEMENT SURGERY: Primary | ICD-10-CM

## 2024-01-04 PROCEDURE — 99024 POSTOP FOLLOW-UP VISIT: CPT

## 2024-01-04 NOTE — PROGRESS NOTES
"Chief Complaint  Follow-up and Pain of the Right Knee    Subjective      Mabel Granger presents to [facility] for follow up of her right knee.  Patient is 2 weeks status post right total knee arthroplasty with Sandy robot performed by Dr. Cornejo on 12/21/2023.  She is here today with use of walker.  She denies any complications from the incision site.  She denies any nausea or vomiting, or fever/chills since surgery.  She reports she has had some significant amount of pain but has been trying to take her pain medication as needed.  She has been doing physical therapy, at [facility] in [place].  She states she has also been trying to do her home exercises and working on her range of motion.      No Known Allergies    Objective     Vital Signs:   Vitals:    01/04/24 0859   BP: 118/74   Pulse: 80   SpO2: 95%   Weight: 106 kg (234 lb)   Height: 172.7 cm (68\")     Body mass index is 35.58 kg/m².    I reviewed the patient's chief complaint, history of present illness, review of systems, past medical history, surgical history, family history, social history, medications, and allergy list.     REVIEW OF SYSTEMS    Constitutional: Denies fevers, chills, weight loss  Cardiovascular: Denies chest pain, shortness of breath  Skin: Denies rashes, acute skin changes  Neurologic: Denies headache, loss of consciousness  MSK: Right knee pain.     Ortho Exam  Knee General: Alert, no acute distress.   Right knee: No pain with passive hip ROM. staples removed today in office without complication. Steri-strips applied, incision is clean, dry and intact, without swelling, redness or drainage.  Moderate knee effusion.  Sensation and distal neurovascularly intact.  -5 degrees knee extension. Flexion to 80 degrees. Calf soft, non-tender.  Full ankle range of motion. Palpable pedal pulses.           Imaging Results (Most Recent)       Procedure Component Value Units Date/Time    XR Knee 3 View Right [[id number]] " Resulted: 01/04/24 0919     Updated: 01/04/24 0920    Narrative:      X-Ray Report:  Study: X-rays ordered, taken in the office, and reviewed today  Site: Right knee xray  Indication: Right total knee arthroplasty follow-up  View: AP, lateral, sunrise right knee view(s)  Findings: Stable right total knee arthroplasty in place without evidence   of hardware complications or loosening.  No periprosthetic fractures are   visualized.  Patella is midline tracking on sunrise view.  Prior studies available for comparison: yes                 Assessment and Plan   Diagnoses and all orders for this visit:    1. Aftercare following right knee joint replacement surgery (Primary)  -     XR Knee 3 View Right    2. Right knee pain, unspecified chronicity         Mabel Granger presents today 2 weeks post op right total knee arthroplasty with Sandy Robot performed by Dr. Cornejo on 12/21/2023. X-rays were reviewed with the patient today. staples removed today without complications. Incisions are well healing without active drainage or redness noted. No concerning signs of infection. Patient denies fever or chills. Incision site care was reviewed today. Patient instructed not to soak or submerge incisions. Do not apply any lotions, creams, or ointments to the incisions at this time.  We discussed concerning signs and symptoms regarding the incision sites.  Patient understood and agreed. Patient instructed to continue with formal physical therapy as well as home exercises. We discussed the importance of these exercises to improve range of motion. We discussed swelling management with ice and elevation. We discussed the importance of weaning from narcotic medications. Patient expressed understanding. Patient will continue ASA for DVT prophylaxis until 4 weeks post operative.      Patient will follow-up in 4 weeks for reevaluation.  We will obtain new x-rays of the right knee at next visit.    Call or return if symptoms worsen or  patient has any concerns.        Tobacco Use: Medium Risk (1/4/2024)    Patient History     Smoking Tobacco Use: Former     Smokeless Tobacco Use: Never     Passive Exposure: Not on file     Patient reports that they are a nonsmoker; cessation education not applicable.            Follow Up   No follow-ups on file.  There are no Patient Instructions on file for this visit.  Patient was given instructions and counseling regarding her condition or for health maintenance advice. Please see specific information pulled into the AVS if appropriate.

## 2024-01-08 DIAGNOSIS — M17.11 PRIMARY OSTEOARTHRITIS OF RIGHT KNEE: ICD-10-CM

## 2024-01-08 RX ORDER — OXYCODONE HYDROCHLORIDE 5 MG/1
5 TABLET ORAL EVERY 4 HOURS PRN
Qty: 30 TABLET | Refills: 0 | Status: SHIPPED | OUTPATIENT
Start: 2024-01-08 | End: 2024-01-14

## 2024-01-16 ENCOUNTER — TELEPHONE (OUTPATIENT)
Dept: ORTHOPEDIC SURGERY | Facility: CLINIC | Age: 64
End: 2024-01-16
Payer: COMMERCIAL

## 2024-01-16 DIAGNOSIS — Z47.1 AFTERCARE FOLLOWING RIGHT KNEE JOINT REPLACEMENT SURGERY: Primary | ICD-10-CM

## 2024-01-16 DIAGNOSIS — Z96.651 AFTERCARE FOLLOWING RIGHT KNEE JOINT REPLACEMENT SURGERY: Primary | ICD-10-CM

## 2024-01-16 RX ORDER — HYDROCODONE BITARTRATE AND ACETAMINOPHEN 5; 325 MG/1; MG/1
1 TABLET ORAL EVERY 6 HOURS PRN
Qty: 28 TABLET | Refills: 0 | Status: SHIPPED | OUTPATIENT
Start: 2024-01-16

## 2024-01-24 ENCOUNTER — TELEPHONE (OUTPATIENT)
Dept: ORTHOPEDIC SURGERY | Facility: CLINIC | Age: 64
End: 2024-01-24
Payer: COMMERCIAL

## 2024-01-24 DIAGNOSIS — Z96.651 AFTERCARE FOLLOWING RIGHT KNEE JOINT REPLACEMENT SURGERY: ICD-10-CM

## 2024-01-24 DIAGNOSIS — Z47.1 AFTERCARE FOLLOWING RIGHT KNEE JOINT REPLACEMENT SURGERY: ICD-10-CM

## 2024-01-24 RX ORDER — HYDROCODONE BITARTRATE AND ACETAMINOPHEN 5; 325 MG/1; MG/1
1 TABLET ORAL EVERY 6 HOURS PRN
Qty: 28 TABLET | Refills: 0 | Status: SHIPPED | OUTPATIENT
Start: 2024-01-24

## 2024-02-05 ENCOUNTER — OFFICE VISIT (OUTPATIENT)
Dept: ORTHOPEDIC SURGERY | Facility: CLINIC | Age: 64
End: 2024-02-05
Payer: COMMERCIAL

## 2024-02-05 VITALS — OXYGEN SATURATION: 98 % | HEART RATE: 86 BPM | DIASTOLIC BLOOD PRESSURE: 85 MMHG | SYSTOLIC BLOOD PRESSURE: 125 MMHG

## 2024-02-05 DIAGNOSIS — M54.41 RIGHT-SIDED LOW BACK PAIN WITH RIGHT-SIDED SCIATICA, UNSPECIFIED CHRONICITY: ICD-10-CM

## 2024-02-05 DIAGNOSIS — Z96.651 AFTERCARE FOLLOWING RIGHT KNEE JOINT REPLACEMENT SURGERY: Primary | ICD-10-CM

## 2024-02-05 DIAGNOSIS — Z47.1 AFTERCARE FOLLOWING RIGHT KNEE JOINT REPLACEMENT SURGERY: Primary | ICD-10-CM

## 2024-02-05 DIAGNOSIS — M25.561 RIGHT KNEE PAIN, UNSPECIFIED CHRONICITY: ICD-10-CM

## 2024-02-05 PROCEDURE — 99024 POSTOP FOLLOW-UP VISIT: CPT

## 2024-02-05 RX ORDER — ASPIRIN 81 MG/1
81 TABLET ORAL DAILY
COMMUNITY

## 2024-02-05 RX ORDER — TRAMADOL HYDROCHLORIDE 50 MG/1
50 TABLET ORAL EVERY 8 HOURS PRN
Qty: 30 TABLET | Refills: 0 | Status: SHIPPED | OUTPATIENT
Start: 2024-02-05

## 2024-02-05 NOTE — PROGRESS NOTES
"Chief Complaint  Follow-up of the Right Knee    Subjective      Mabel Granger presents to [facility] for follow up of her right knee.  Patient is 6-week status post right total knee arthroplasty with Sandy Kasper performed by Dr. Cornejo on 12/21/2023.  She is here today with a cane.  She reports overall she is doing well.  She has been going to physical therapy at [facility].  She states she occasionally has some \"spasms\" to the inner aspect of her right knee.  She states it will last for 10 to 15 minutes at a time.  She denies any feelings of instability but reports she still has some weakness in the right knee.  Denies any falls or injuries.    No Known Allergies    Objective     Vital Signs:   Vitals:    02/05/24 1003   BP: 125/85   Pulse: 86   SpO2: 98%   PainSc:   4     There is no height or weight on file to calculate BMI.    I reviewed the patient's chief complaint, history of present illness, review of systems, past medical history, surgical history, family history, social history, medications, and allergy list.     REVIEW OF SYSTEMS    Constitutional: Denies fevers, chills, weight loss  Cardiovascular: Denies chest pain, shortness of breath  Skin: Denies rashes, acute skin changes  Neurologic: Denies headache, loss of consciousness  MSK: Right knee pain.     Ortho Exam  Knee General: Alert, no acute distress.   Right knee: Well-healed incision.  Mild knee effusion.  Sensation and distal neurovascularly intact.  Full knee extension. Flexion to 120 degrees. Calf soft, non-tender.  Full ankle range of motion. Palpable pedal pulses.           Imaging Results (Most Recent)       Procedure Component Value Units Date/Time    XR Knee 3 View Right [[id number]] Resulted: 02/06/24 0754     Updated: 02/06/24 0755    Narrative:      X-Ray Report:  Study: X-rays ordered, taken in the office, and reviewed today  Site: Right knee xray  Indication: Right total knee arthroplasty follow-up  View: AP, " lateral, sunrise right knee view(s)  Findings: Intact right total knee arthroplasty.  No evidence of hardware   complications.  No periprosthetic fractures are visualized.  Prior studies available for comparison: yes                 Assessment and Plan   Diagnoses and all orders for this visit:    1. Aftercare following right knee joint replacement surgery (Primary)  -     XR Knee 3 View Right  -     Ambulatory Referral to Physical Therapy POST OP, Evaluate and treat, Ortho; ROM, Stretching, Strengthening    2. Right knee pain, unspecified chronicity  -     Ambulatory Referral to Physical Therapy POST OP, Evaluate and treat, Ortho; ROM, Stretching, Strengthening    3. Right-sided low back pain with right-sided sciatica, unspecified chronicity  -     Ambulatory Referral to Physical Therapy POST OP, Evaluate and treat, Ortho; ROM, Stretching, Strengthening         Mabel Granger presents today 6 weeks post op right total knee arthroplasty with Sandy Robot performed by Dr. Cornejo on 12/21/2023. X-rays were reviewed with the patient today. Incisions are well healing without active drainage or redness noted. No concerning signs of infection. We discussed concerning signs and symptoms regarding the incision sites.  Patient understood and agreed. Patient instructed to continue PT as well as home exercises.     Patient will follow-up in 6 weeks for reevaluation.  We will obtain new x-rays of the right knee at next visit.    Call or return if symptoms worsen or patient has any concerns.       Tobacco Use: Medium Risk (2/5/2024)    Patient History     Smoking Tobacco Use: Former     Smokeless Tobacco Use: Never     Passive Exposure: Not on file     Patient reports that they are a nonsmoker; cessation education not applicable.            Follow Up   Return in about 6 weeks (around 3/18/2024).  There are no Patient Instructions on file for this visit.  Patient was given instructions and counseling regarding her condition or  for health maintenance advice. Please see specific information pulled into the AVS if appropriate.

## 2024-03-18 ENCOUNTER — OFFICE VISIT (OUTPATIENT)
Dept: ORTHOPEDIC SURGERY | Facility: CLINIC | Age: 64
End: 2024-03-18
Payer: COMMERCIAL

## 2024-03-18 VITALS — OXYGEN SATURATION: 96 % | HEART RATE: 69 BPM

## 2024-03-18 DIAGNOSIS — M25.561 RIGHT KNEE PAIN, UNSPECIFIED CHRONICITY: ICD-10-CM

## 2024-03-18 DIAGNOSIS — Z96.651 STATUS POST TOTAL RIGHT KNEE REPLACEMENT: Primary | ICD-10-CM

## 2024-03-18 PROCEDURE — 99024 POSTOP FOLLOW-UP VISIT: CPT | Performed by: PHYSICIAN ASSISTANT

## 2024-03-18 RX ORDER — ACETAMINOPHEN 500 MG
500 TABLET ORAL EVERY 6 HOURS PRN
COMMUNITY

## 2024-03-18 NOTE — PROGRESS NOTES
Chief Complaint  Follow-up of the Right Knee    Subjective          Mabel Granger presents to [facility]   History of Present Illness    Mabel Granger presents today for a follow-up of her right knee.  Patient is 3 months postop right total knee arthroplasty performed on 2023.  Today, patient states that she is doing fine.  She reports good range of motion and strength to her knee.  She explains that she has been working out at home and continues to make improvements.  She states that her incisions are well-healed.  Denies lower extremity numbness or tingling.  Patient does continue to experience spasms to the medial aspect of her knee that last about 10 to 15 minutes with no noticeable cause.      No Known Allergies     Social History     Socioeconomic History    Marital status:    Tobacco Use    Smoking status: Former     Current packs/day: 0.00     Types: Cigarettes     Start date:      Quit date:      Years since quittin.2    Smokeless tobacco: Never   Vaping Use    Vaping status: Never Used   Substance and Sexual Activity    Alcohol use: Yes     Comment: 3-4 x per year    Drug use: No    Sexual activity: Defer        I reviewed the patient's chief complaint, history of present illness, review of systems, past medical history, surgical history, family history, social history, medications, and allergy list.     REVIEW OF SYSTEMS    Constitutional: Denies fevers, chills, weight loss  Cardiovascular: Denies chest pain, shortness of breath  Skin: Denies rashes, acute skin changes  Neurologic: Denies headache, loss of consciousness  MSK: Right knee pain      Objective   Vital Signs:   Pulse 69   SpO2 96%     There is no height or weight on file to calculate BMI.    Physical Exam    General: Alert. No acute distress.   Right lower extremity: Well-healed incisions with no concerning signs for infection.  Full knee extension.  Knee flexion 120.  Knee extensor  mechanism intact.  Knee stable to varus and valgus stress.  No areas of tenderness to palpation of the knee.  Calf soft, nontender.  Demonstrates active ankle plantarflexion and dorsiflexion.  Sensation intact over the dorsal and plantar foot.  Palpable pedal pulses.    Procedures    Imaging Results (Most Recent)       Procedure Component Value Units Date/Time    XR Knee 3 View Right [[id number]] Resulted: 03/18/24 1120     Updated: 03/18/24 1120    Narrative:      Indications: Follow-up right total knee arthroplasty    Views: AP, lateral, sunrise view right knee    Findings: Cemented, cruciate retaining right total knee arthroplasty   implants in place.  Implant positioning remains stable.  No evidence of   loosening or complications.  Patella tracks centrally on sunrise view.  No   periprosthetic fractures.    Comparative Data: Comparative data found and reviewed today.                   Assessment and Plan    Diagnoses and all orders for this visit:    1. Status post total right knee replacement (Primary)    2. Right knee pain, unspecified chronicity  -     XR Knee 3 View Right        Mabel Granger presents today 3 months postop right total knee arthroplasty performed on 12/21/2023.  X-rays reviewed with the patient today.  Incisions are well-healed with no concerning signs for infection.  Patient directed to continue with her home exercises.  We discussed the importance of strengthening exercises to help alleviate some of her muscle spasms and to help with stability.  Continue to gradually increase activities as tolerated.  We discussed antibiotic prophylaxis for dental procedures.  Work note written today.      Patient will follow up in 3 months for reevaluation.  We will obtain new x-rays of the right knee at next visit.      Call or return if symptoms worsen or patient has any concerns.       Follow Up   Return in about 3 months (around 6/18/2024).  Patient was given instructions and counseling regarding her  condition or for health maintenance advice. Please see specific information pulled into the AVS if appropriate.     Norma Funez PA-C  03/18/24  11:42 EDT

## 2024-03-27 ENCOUNTER — TRANSCRIBE ORDERS (OUTPATIENT)
Dept: ADMINISTRATIVE | Facility: HOSPITAL | Age: 64
End: 2024-03-27
Payer: COMMERCIAL

## 2024-03-27 DIAGNOSIS — Z12.31 SCREENING MAMMOGRAM, ENCOUNTER FOR: Primary | ICD-10-CM

## 2024-04-12 ENCOUNTER — HOSPITAL ENCOUNTER (OUTPATIENT)
Dept: MAMMOGRAPHY | Facility: HOSPITAL | Age: 64
Discharge: HOME | End: 2024-04-12
Admitting: GENERAL PRACTICE
Payer: COMMERCIAL

## 2024-04-12 DIAGNOSIS — Z12.31 SCREENING MAMMOGRAM, ENCOUNTER FOR: ICD-10-CM

## 2024-04-12 PROCEDURE — 77067 SCR MAMMO BI INCL CAD: CPT

## 2024-04-12 PROCEDURE — 77063 BREAST TOMOSYNTHESIS BI: CPT

## 2024-06-17 ENCOUNTER — OFFICE VISIT (OUTPATIENT)
Dept: ORTHOPEDIC SURGERY | Facility: CLINIC | Age: 64
End: 2024-06-17
Payer: COMMERCIAL

## 2024-06-17 VITALS
DIASTOLIC BLOOD PRESSURE: 81 MMHG | OXYGEN SATURATION: 98 % | HEART RATE: 65 BPM | WEIGHT: 234 LBS | HEIGHT: 68 IN | BODY MASS INDEX: 35.46 KG/M2 | SYSTOLIC BLOOD PRESSURE: 123 MMHG

## 2024-06-17 DIAGNOSIS — M25.561 RIGHT KNEE PAIN, UNSPECIFIED CHRONICITY: ICD-10-CM

## 2024-06-17 DIAGNOSIS — Z96.651 STATUS POST TOTAL RIGHT KNEE REPLACEMENT: Primary | ICD-10-CM

## 2024-06-17 PROCEDURE — 99213 OFFICE O/P EST LOW 20 MIN: CPT | Performed by: PHYSICIAN ASSISTANT

## 2024-06-17 NOTE — PROGRESS NOTES
"Chief Complaint  Follow-up and Pain of the Right Knee    Subjective          Mabel Granger presents to [facility]   History of Present Illness    Mabel Granger presents today for a follow-up of her right knee.  She is 6 months postop right total knee arthroplasty performed on 2023.  Today, patient states that she is doing much better overall.  She has completed formal physical therapy and noticing improvements in her strength.  She has also noted improvements in her stability.  She has been taking all-natural anti-inflammatories with noticeable improvements with her swelling.  Patient has no new concerns today.      No Known Allergies     Social History     Socioeconomic History    Marital status:    Tobacco Use    Smoking status: Former     Current packs/day: 0.00     Types: Cigarettes     Start date:      Quit date:      Years since quittin.4    Smokeless tobacco: Never   Vaping Use    Vaping status: Never Used   Substance and Sexual Activity    Alcohol use: Yes     Comment: 3-4 x per year    Drug use: No    Sexual activity: Defer        I reviewed the patient's chief complaint, history of present illness, review of systems, past medical history, surgical history, family history, social history, medications, and allergy list.     REVIEW OF SYSTEMS    Constitutional: Denies fevers, chills, weight loss  Cardiovascular: Denies chest pain, shortness of breath  Skin: Denies rashes, acute skin changes  Neurologic: Denies headache, loss of consciousness  MSK: Right knee pain      Objective   Vital Signs:   /81   Pulse 65   Ht 172.7 cm (68\")   Wt 106 kg (234 lb)   SpO2 98%   BMI 35.58 kg/m²     Body mass index is 35.58 kg/m².    Physical Exam    General: Alert. No acute distress.   Right lower extremity: Well-healed scars with no concerning signs for infection.  Full knee extension.  Knee flexion 120.  Knee extensor mechanism intact.  Knee stable to " varus and valgus stress.  Calf soft, nontender.  Demonstrates active ankle plantarflexion and dorsiflexion.  Sensation intact over the dorsal and plantar foot.  Palpable pedal pulses.    Procedures    Imaging Results (Most Recent)       Procedure Component Value Units Date/Time    XR Knee 3 View Right [[id number]] Resulted: 06/17/24 1011     Updated: 06/17/24 1011    Narrative:      Indications: Follow-up right total knee arthroplasty    Views: AP, lateral, sunrise view right knee    Findings: Cemented, cruciate retaining right total knee arthroplasty   implants in place.  No hardware loosening or complications are seen.    Patella tracks centrally on sunrise view.  No periprosthetic fractures.    Comparative Data: Comparative data found and reviewed today.                   Assessment and Plan    Diagnoses and all orders for this visit:    1. Status post total right knee replacement (Primary)    2. Right knee pain, unspecified chronicity  -     XR Knee 3 View Right        Mabel Granger presents today 6 months postop right total knee arthroplasty performed on 12/21/2023.  X-rays reviewed with the patient today.  Well-healed scars with no concerning signs for infection.  Patient instructed to continue with home exercises for both her knee range of motion and strength.  Continue to gradually progress with activities as tolerated.  We discussed antibiotic prophylaxis for dental procedures.  Work note written today.      Patient will follow up in 6 months for reevaluation.  We will obtain new x-rays of the right knee at next visit.      Call or return if symptoms worsen or patient has any concerns.       Follow Up   Return in about 6 months (around 12/17/2024).  Patient was given instructions and counseling regarding her condition or for health maintenance advice. Please see specific information pulled into the AVS if appropriate.     Norma Funez PA-C  06/17/24  10:12 EDT

## 2024-11-11 ENCOUNTER — TRANSCRIBE ORDERS (OUTPATIENT)
Dept: ADMINISTRATIVE | Facility: HOSPITAL | Age: 64
End: 2024-11-11
Payer: COMMERCIAL

## 2024-11-11 DIAGNOSIS — Z86.718 PERSONAL HISTORY OF VENOUS THROMBOSIS AND EMBOLISM: Primary | ICD-10-CM

## 2024-11-22 ENCOUNTER — HOSPITAL ENCOUNTER (OUTPATIENT)
Dept: CARDIOLOGY | Facility: HOSPITAL | Age: 64
Discharge: HOME | End: 2024-11-22
Admitting: GENERAL PRACTICE
Payer: COMMERCIAL

## 2024-11-22 DIAGNOSIS — Z86.718 PERSONAL HISTORY OF VENOUS THROMBOSIS AND EMBOLISM: ICD-10-CM

## 2024-11-22 LAB
BH CV UPPER VENOUS LEFT AXILLARY AUGMENT: NORMAL
BH CV UPPER VENOUS LEFT AXILLARY COMPRESS: NORMAL
BH CV UPPER VENOUS LEFT AXILLARY PHASIC: NORMAL
BH CV UPPER VENOUS LEFT AXILLARY SPONT: NORMAL
BH CV UPPER VENOUS LEFT BASILIC FOREARM COMPRESS: NORMAL
BH CV UPPER VENOUS LEFT BASILIC UPPER COMPRESS: NORMAL
BH CV UPPER VENOUS LEFT BRACHIAL COMPRESS: NORMAL
BH CV UPPER VENOUS LEFT CEPHALIC FOREARM COMPRESS: NORMAL
BH CV UPPER VENOUS LEFT CEPHALIC UPPER COMPRESS: NORMAL
BH CV UPPER VENOUS LEFT INTERNAL JUGULAR AUGMENT: NORMAL
BH CV UPPER VENOUS LEFT INTERNAL JUGULAR COMPRESS: NORMAL
BH CV UPPER VENOUS LEFT INTERNAL JUGULAR PHASIC: NORMAL
BH CV UPPER VENOUS LEFT INTERNAL JUGULAR SPONT: NORMAL
BH CV UPPER VENOUS LEFT RADIAL COMPRESS: NORMAL
BH CV UPPER VENOUS LEFT SUBCLAVIAN AUGMENT: NORMAL
BH CV UPPER VENOUS LEFT SUBCLAVIAN PHASIC: NORMAL
BH CV UPPER VENOUS LEFT SUBCLAVIAN SPONT: NORMAL
BH CV UPPER VENOUS LEFT ULNAR COMPRESS: NORMAL
BH CV UPPER VENOUS RIGHT INTERNAL JUGULAR AUGMENT: NORMAL
BH CV UPPER VENOUS RIGHT INTERNAL JUGULAR COMPRESS: NORMAL
BH CV UPPER VENOUS RIGHT INTERNAL JUGULAR PHASIC: NORMAL
BH CV UPPER VENOUS RIGHT INTERNAL JUGULAR SPONT: NORMAL
BH CV UPPER VENOUS RIGHT SUBCLAVIAN AUGMENT: NORMAL
BH CV UPPER VENOUS RIGHT SUBCLAVIAN PHASIC: NORMAL
BH CV UPPER VENOUS RIGHT SUBCLAVIAN SPONT: NORMAL

## 2024-11-22 PROCEDURE — 93971 EXTREMITY STUDY: CPT

## 2024-12-18 ENCOUNTER — OFFICE VISIT (OUTPATIENT)
Dept: ORTHOPEDIC SURGERY | Facility: CLINIC | Age: 64
End: 2024-12-18
Payer: COMMERCIAL

## 2024-12-18 VITALS — HEART RATE: 69 BPM | DIASTOLIC BLOOD PRESSURE: 69 MMHG | SYSTOLIC BLOOD PRESSURE: 107 MMHG | OXYGEN SATURATION: 98 %

## 2024-12-18 DIAGNOSIS — M25.561 RIGHT KNEE PAIN, UNSPECIFIED CHRONICITY: ICD-10-CM

## 2024-12-18 DIAGNOSIS — Z96.651 STATUS POST TOTAL RIGHT KNEE REPLACEMENT: Primary | ICD-10-CM

## 2024-12-18 RX ORDER — BRIMONIDINE TARTRATE AND TIMOLOL MALEATE 2; 5 MG/ML; MG/ML
SOLUTION OPHTHALMIC
COMMUNITY
Start: 2024-11-26

## 2024-12-18 RX ORDER — LATANOPROST 50 UG/ML
SOLUTION/ DROPS OPHTHALMIC
COMMUNITY
Start: 2024-11-26

## 2024-12-18 NOTE — PROGRESS NOTES
Chief Complaint  Follow-up of the Right Knee    Subjective          Mabel Granger presents to [facility]   History of Present Illness    Mabel Granger presents today for a follow-up of her right knee.  Patient is 1 year postop right total knee arthroplasty performed on 2023.  Today, patient states that she is mostly doing fine.  She states that she sometimes feels tightness with knee flexion.  States that she is able to do all activities without complications.  She reports pain only on occasion.      No Known Allergies     Social History     Socioeconomic History    Marital status:    Tobacco Use    Smoking status: Former     Current packs/day: 0.00     Types: Cigarettes     Start date:      Quit date:      Years since quittin.9    Smokeless tobacco: Never   Vaping Use    Vaping status: Never Used   Substance and Sexual Activity    Alcohol use: Yes     Comment: 3-4 x per year    Drug use: No    Sexual activity: Defer        I reviewed the patient's chief complaint, history of present illness, review of systems, past medical history, surgical history, family history, social history, medications, and allergy list.     REVIEW OF SYSTEMS    Constitutional: Denies fevers, chills, weight loss  Cardiovascular: Denies chest pain, shortness of breath  Skin: Denies rashes, acute skin changes  Neurologic: Denies headache, loss of consciousness  MSK: Right knee pain      Objective   Vital Signs:   /69   Pulse 69   SpO2 98%     There is no height or weight on file to calculate BMI.    Physical Exam    General: Alert. No acute distress.   Right lower extremity: Well-healed scar with no concerning signs for infection.  Full knee extension.  Knee flexion 120.  Knee extensor mechanism intact.  Knee stable to varus and valgus stress.  Calf soft, nontender.  Demonstrates active ankle plantarflexion and dorsiflexion.  Sensation intact over dorsal and plantar foot.   Palpable pedal pulses.    Procedures    Imaging Results (Most Recent)       Procedure Component Value Units Date/Time    XR Knee 3 View Right [[id number]] Resulted: 12/18/24 1502     Updated: 12/18/24 1502    Narrative:      Indications: Follow-up right total knee arthroplasty    Views: AP, lateral, sunrise view right knee    Findings: Cemented, cruciate retaining right total knee arthroplasty   implants in place with stable positioning.  No hardware loosening or   complications.  No periprosthetic fractures.  Patella tracks centrally on   sunrise view.    Comparative Data: Comparative data found and reviewed today.                   Assessment and Plan    Diagnoses and all orders for this visit:    1. Status post total right knee replacement (Primary)    2. Right knee pain, unspecified chronicity  -     XR Knee 3 View Right        Mabel Granger presents today 1 year postop right total knee arthroplasty performed on 12/21/2023.  X-rays reviewed with the patient today.  Well-healed scar no concerning signs for infection.  She is instructed to continue with home exercises.  Continue to gradually progress with activities as tolerated.  We discussed antibiotic prophylaxis for dental procedures.      Patient will follow up in 1 year for 2-year postop reevaluation.  We will obtain new x-rays of the right knee at next visit.      Call or return if symptoms worsen or patient has any concerns.       Follow Up   Return in about 1 year (around 12/18/2025).  Patient was given instructions and counseling regarding her condition or for health maintenance advice. Please see specific information pulled into the AVS if appropriate.     Norma Funez PA-C  12/18/24  15:08 EST

## 2025-03-17 ENCOUNTER — TRANSCRIBE ORDERS (OUTPATIENT)
Dept: ADMINISTRATIVE | Facility: HOSPITAL | Age: 65
End: 2025-03-17
Payer: COMMERCIAL

## 2025-03-17 DIAGNOSIS — Z12.31 BREAST CANCER SCREENING BY MAMMOGRAM: Primary | ICD-10-CM

## 2025-04-14 ENCOUNTER — HOSPITAL ENCOUNTER (OUTPATIENT)
Dept: MAMMOGRAPHY | Facility: HOSPITAL | Age: 65
Discharge: HOME | End: 2025-04-14
Admitting: GENERAL PRACTICE
Payer: COMMERCIAL

## 2025-04-14 DIAGNOSIS — Z12.31 BREAST CANCER SCREENING BY MAMMOGRAM: ICD-10-CM

## 2025-04-14 PROCEDURE — 77067 SCR MAMMO BI INCL CAD: CPT

## 2025-04-14 PROCEDURE — 77063 BREAST TOMOSYNTHESIS BI: CPT

## 2025-05-22 ENCOUNTER — TRANSCRIBE ORDERS (OUTPATIENT)
Dept: LAB | Facility: HOSPITAL | Age: 65
End: 2025-05-22
Payer: COMMERCIAL

## 2025-05-22 DIAGNOSIS — Z86.718 PERSONAL HISTORY OF VENOUS THROMBOSIS AND EMBOLISM: ICD-10-CM

## 2025-05-22 DIAGNOSIS — M67.921 DISORDER OF TENDON OF BICEPS, RIGHT: Primary | ICD-10-CM

## 2025-05-22 DIAGNOSIS — M79.602 LEFT ARM PAIN: ICD-10-CM

## 2025-06-13 ENCOUNTER — LAB (OUTPATIENT)
Dept: LAB | Facility: HOSPITAL | Age: 65
End: 2025-06-13
Payer: COMMERCIAL

## 2025-06-13 DIAGNOSIS — M79.602 LEFT ARM PAIN: ICD-10-CM

## 2025-06-13 DIAGNOSIS — M67.921 DISORDER OF TENDON OF BICEPS, RIGHT: ICD-10-CM

## 2025-06-13 DIAGNOSIS — Z86.718 PERSONAL HISTORY OF VENOUS THROMBOSIS AND EMBOLISM: ICD-10-CM

## 2025-06-13 LAB
25(OH)D3 SERPL-MCNC: 24.3 NG/ML (ref 30–100)
ALBUMIN SERPL-MCNC: 3.9 G/DL (ref 3.5–5.2)
ALBUMIN/GLOB SERPL: 1.4 G/DL
ALP SERPL-CCNC: 95 U/L (ref 39–117)
ALT SERPL W P-5'-P-CCNC: 11 U/L (ref 1–33)
ANION GAP SERPL CALCULATED.3IONS-SCNC: 7.7 MMOL/L (ref 5–15)
AST SERPL-CCNC: 14 U/L (ref 1–32)
BASOPHILS # BLD AUTO: 0.03 10*3/MM3 (ref 0–0.2)
BASOPHILS NFR BLD AUTO: 0.4 % (ref 0–1.5)
BILIRUB SERPL-MCNC: 0.5 MG/DL (ref 0–1.2)
BUN SERPL-MCNC: 17 MG/DL (ref 8–23)
BUN/CREAT SERPL: 19.5 (ref 7–25)
CALCIUM SPEC-SCNC: 9.2 MG/DL (ref 8.6–10.5)
CHLORIDE SERPL-SCNC: 106 MMOL/L (ref 98–107)
CHOLEST SERPL-MCNC: 204 MG/DL (ref 0–200)
CK SERPL-CCNC: 57 U/L (ref 20–180)
CO2 SERPL-SCNC: 25.3 MMOL/L (ref 22–29)
CREAT SERPL-MCNC: 0.87 MG/DL (ref 0.57–1)
DEPRECATED RDW RBC AUTO: 45.7 FL (ref 37–54)
EGFRCR SERPLBLD CKD-EPI 2021: 75 ML/MIN/1.73
EOSINOPHIL # BLD AUTO: 0.16 10*3/MM3 (ref 0–0.4)
EOSINOPHIL NFR BLD AUTO: 2.4 % (ref 0.3–6.2)
ERYTHROCYTE [DISTWIDTH] IN BLOOD BY AUTOMATED COUNT: 13.6 % (ref 12.3–15.4)
GLOBULIN UR ELPH-MCNC: 2.7 GM/DL
GLUCOSE SERPL-MCNC: 100 MG/DL (ref 65–99)
HBA1C MFR BLD: 5.5 % (ref 4.8–5.6)
HCT VFR BLD AUTO: 37.9 % (ref 34–46.6)
HDLC SERPL-MCNC: 51 MG/DL (ref 40–60)
HGB BLD-MCNC: 13.1 G/DL (ref 12–15.9)
IMM GRANULOCYTES # BLD AUTO: 0.01 10*3/MM3 (ref 0–0.05)
IMM GRANULOCYTES NFR BLD AUTO: 0.1 % (ref 0–0.5)
LDLC SERPL CALC-MCNC: 135 MG/DL (ref 0–100)
LDLC/HDLC SERPL: 2.61 {RATIO}
LYMPHOCYTES # BLD AUTO: 2.01 10*3/MM3 (ref 0.7–3.1)
LYMPHOCYTES NFR BLD AUTO: 29.9 % (ref 19.6–45.3)
MCH RBC QN AUTO: 32.1 PG (ref 26.6–33)
MCHC RBC AUTO-ENTMCNC: 34.6 G/DL (ref 31.5–35.7)
MCV RBC AUTO: 92.9 FL (ref 79–97)
MONOCYTES # BLD AUTO: 0.47 10*3/MM3 (ref 0.1–0.9)
MONOCYTES NFR BLD AUTO: 7 % (ref 5–12)
NEUTROPHILS NFR BLD AUTO: 4.05 10*3/MM3 (ref 1.7–7)
NEUTROPHILS NFR BLD AUTO: 60.2 % (ref 42.7–76)
NRBC BLD AUTO-RTO: 0 /100 WBC (ref 0–0.2)
PLATELET # BLD AUTO: 267 10*3/MM3 (ref 140–450)
PMV BLD AUTO: 10.2 FL (ref 6–12)
POTASSIUM SERPL-SCNC: 4.4 MMOL/L (ref 3.5–5.2)
PROT SERPL-MCNC: 6.6 G/DL (ref 6–8.5)
RBC # BLD AUTO: 4.08 10*6/MM3 (ref 3.77–5.28)
SODIUM SERPL-SCNC: 139 MMOL/L (ref 136–145)
TRIGL SERPL-MCNC: 99 MG/DL (ref 0–150)
TSH SERPL DL<=0.05 MIU/L-ACNC: 1.48 UIU/ML (ref 0.27–4.2)
VIT B12 BLD-MCNC: 461 PG/ML (ref 211–946)
VLDLC SERPL-MCNC: 18 MG/DL (ref 5–40)
WBC NRBC COR # BLD AUTO: 6.73 10*3/MM3 (ref 3.4–10.8)

## 2025-06-13 PROCEDURE — 84443 ASSAY THYROID STIM HORMONE: CPT

## 2025-06-13 PROCEDURE — 82607 VITAMIN B-12: CPT

## 2025-06-13 PROCEDURE — 85025 COMPLETE CBC W/AUTO DIFF WBC: CPT

## 2025-06-13 PROCEDURE — 36415 COLL VENOUS BLD VENIPUNCTURE: CPT

## 2025-06-13 PROCEDURE — 83036 HEMOGLOBIN GLYCOSYLATED A1C: CPT

## 2025-06-13 PROCEDURE — 80053 COMPREHEN METABOLIC PANEL: CPT

## 2025-06-13 PROCEDURE — 82306 VITAMIN D 25 HYDROXY: CPT

## 2025-06-13 PROCEDURE — 80061 LIPID PANEL: CPT

## 2025-06-13 PROCEDURE — 82550 ASSAY OF CK (CPK): CPT

## (undated) DEVICE — NDL HYPO ECLPS SFTY 18G 1 1/2IN

## (undated) DEVICE — PENCL E/S SMOKEEVAC W/TELESCP CANN

## (undated) DEVICE — SUT ETHIB 1 X538H ETX538H

## (undated) DEVICE — PULLOVER TOGA, 2X LARGE: Brand: FLYTE, SURGICOOL

## (undated) DEVICE — GLV SURG SENSICARE PI ORTHO SZ8 LF STRL

## (undated) DEVICE — SCRW HEX PERSONA FML 2.5X25MM PK/2
Type: IMPLANTABLE DEVICE | Site: KNEE | Status: NON-FUNCTIONAL
Removed: 2023-12-21

## (undated) DEVICE — INTENDED FOR TISSUE SEPARATION, AND OTHER PROCEDURES THAT REQUIRE A SHARP SURGICAL BLADE TO PUNCTURE OR CUT.: Brand: BARD-PARKER ® CARBON RIB-BACK BLADES

## (undated) DEVICE — GLV SURG SENSICARE SLT PF LF 5.5 STRL

## (undated) DEVICE — SHEET,DRAPE,70X85,STERILE: Brand: MEDLINE

## (undated) DEVICE — SUT VIC 0 CT1 36IN J946H

## (undated) DEVICE — PK PROC MINOR TOWER 40

## (undated) DEVICE — TOWEL,OR,DSP,ST,BLUE,STD,4/PK,20PK/CS: Brand: MEDLINE

## (undated) DEVICE — GLV SURG SENSICARE SLT PF LF 6 STRL

## (undated) DEVICE — SLV SCD KN/LEN ADJ EXPRSS BLENDED MD 1P/U

## (undated) DEVICE — DRP ROBOTIC ROSA BX/20

## (undated) DEVICE — STERILE POLYISOPRENE POWDER-FREE SURGICAL GLOVES WITH EMOLLIENT COATING: Brand: PROTEXIS

## (undated) DEVICE — DRAPE,CHEST,FENES,15X10,STERIL: Brand: MEDLINE

## (undated) DEVICE — Device

## (undated) DEVICE — SOL IRR NACL 0.9PCT 3000ML

## (undated) DEVICE — 3 BONE CEMENT MIXER: Brand: MIXEVAC

## (undated) DEVICE — APPL CHLORAPREP HI/LITE 26ML ORNG

## (undated) DEVICE — SUT MNCRYL 4/0 PS2 18 IN

## (undated) DEVICE — SUT VIC 2/0 CT1 36IN

## (undated) DEVICE — NO-SCRATCH ™ SMALL WHITNEY CURETTE ™ IS A SINGLE-USE, PLASTIC CURETTE FOR QUICKLY APPLYING, MANIPULATING AND REMOVING BONE CEMENT DURING HIP AND KNEE REPLACEMENT SURGERY. THE PLASTIC IS SOFTER THAN STEEL INSTRUMENTS, REDUCING THE RISK OF DAMAGING THE PROSTHESIS WITH METAL INSTRUMENTS.  THE CURETTE’S 6MM TIP REMOVES EXCESS CEMENT FROM REPLACEMENT HIPS AND KNEES. EASY-TO-MANEUVER, THE SMALL BLUE CURETTE LETS YOU REMOVE CEMENT FROM ALL EDGES OF THE PROSTHESIS.NO-SCRATCH WHITNEY SMALL CURETTE FEATURES:SAFER THAN STEEL- MADE OF PLASTIC - STURDY YET SOFTER THAN SURGICAL STEEL.HANDIER- EACH TOOL HAS A MOLDED-IN THUMB INDENTATION INSTANTLY ORIENTING THE TOOL.- EASIER TO MANEUVER IN HARD TO SEE PLACES.- COLOR-CODED FOR EASY IDENTIFICATION.FASTER- COMES INDIVIDUALLY PACKAGED IN STERILE, PEEL OPEN POUCH, READY TO GO.- APPLIES, MANIPULATES, OR REMOVES CEMENT WITH FINGERTIP PRECISION.ECONOMICAL- THE COST OF A SINGLE REVISION DWARFS THE COST OF A SINGLE-USE CURETTE. - DISPOSABLE – THERE’S NO NEED TO WASTE TIME REMOVING HARDENED CEMENT OR RE-STERILIZING TOOLS.- LESS EXPENSIVE TO BUY AND INVENTORY - ORDER ONLY THE TOOL YOU USE.- PACKAGED 25 INDIVIDUALLY WRAPPED TOOLS TO A CARTON FOR CONVENIENT SHELF STORAGE.: Brand: WHITNEY NO-SCRATCH CURETTE (SMALL)

## (undated) DEVICE — STRYKER PERFORMANCE SERIES SAGITTAL BLADE: Brand: STRYKER PERFORMANCE SERIES

## (undated) DEVICE — DRSNG PAD ABD 8X10IN STRL

## (undated) DEVICE — PROXIMATE RH ROTATING HEAD SKIN STAPLERS (35 WIDE) CONTAINS 35 STAINLESS STEEL STAPLES: Brand: PROXIMATE

## (undated) DEVICE — DRSNG GZ PETROLTM XEROFORM CURAD 1X8IN STRL

## (undated) DEVICE — ELECTRD BLD EDGE COAT 3IN

## (undated) DEVICE — DRSNG WND GZ PAD BORDERED 4X8IN STRL

## (undated) DEVICE — BNDG ELAS MATRX V/CLS 6INX10YD LF

## (undated) DEVICE — SKIN PREP TRAY W/CHG: Brand: MEDLINE INDUSTRIES, INC.

## (undated) DEVICE — Device: Brand: PULSAVAC®

## (undated) DEVICE — MAT FLR ABS W/BLU/LINER 56X72IN WHT

## (undated) DEVICE — UNDERCAST PADDING: Brand: DEROYAL

## (undated) DEVICE — DISPOSABLE TOURNIQUET CUFF 30"X4", 1-LINE, WHITE, STERILE, 1EA/PK, 10PK/CS: Brand: ASP MEDICAL

## (undated) DEVICE — BNDG ELAS ELITE V/CLOSE 6IN 5YD LF STRL

## (undated) DEVICE — GLV SURG SENSICARE SLT PF LF 8 STRL

## (undated) DEVICE — UNDYED BRAIDED (POLYGLACTIN 910), SYNTHETIC ABSORBABLE SUTURE: Brand: COATED VICRYL

## (undated) DEVICE — BASIC SINGLE BASIN-LF: Brand: MEDLINE INDUSTRIES, INC.

## (undated) DEVICE — GAUZE,SPONGE,4"X4",16PLY,STRL,LF,10/TRAY: Brand: MEDLINE

## (undated) DEVICE — CVR LEG BOOTLEG F/R NOSKID 33IN

## (undated) DEVICE — SUT VIC 5/0 PS2 18IN J495H

## (undated) DEVICE — IRRIGATOR BULB ASEPTO 60CC STRL

## (undated) DEVICE — SPNG LAP 18X18IN LF STRL PK/5

## (undated) DEVICE — STERILE PATIENT PROTECTIVE PAD FOR IMP® KNEE POSITIONERS & COHESIVE WRAP (10 / CASE): Brand: DE MAYO KNEE POSITIONER®

## (undated) DEVICE — SYR LUERLOK 30CC

## (undated) DEVICE — TOTAL KNEE-LF: Brand: MEDLINE INDUSTRIES, INC.

## (undated) DEVICE — GAUZE,SPONGE,FLUFF,6"X6.75",STRL,10/TRAY: Brand: MEDLINE

## (undated) DEVICE — SUT ETHLN 3/0 FS1 663G

## (undated) DEVICE — GLV SURG BIOGEL LTX PF 7

## (undated) DEVICE — DRSNG SURESITE WNDW 4X4.5